# Patient Record
Sex: FEMALE | Race: WHITE | NOT HISPANIC OR LATINO | Employment: UNEMPLOYED | URBAN - METROPOLITAN AREA
[De-identification: names, ages, dates, MRNs, and addresses within clinical notes are randomized per-mention and may not be internally consistent; named-entity substitution may affect disease eponyms.]

---

## 2023-08-10 ENCOUNTER — HOSPITAL ENCOUNTER (OUTPATIENT)
Dept: DATA CONVERSION | Facility: HOSPITAL | Age: 23
Discharge: HOME | End: 2023-08-10

## 2023-08-10 DIAGNOSIS — M25.552 PAIN IN LEFT HIP: ICD-10-CM

## 2023-11-13 PROBLEM — K59.09 CHRONIC CONSTIPATION: Status: ACTIVE | Noted: 2023-11-13

## 2023-11-13 PROBLEM — K92.1 HEMATOCHEZIA: Status: ACTIVE | Noted: 2023-11-13

## 2023-11-13 PROBLEM — F41.9 ANXIETY: Status: ACTIVE | Noted: 2023-11-13

## 2023-11-13 PROBLEM — M25.852 FEMOROACETABULAR IMPINGEMENT OF LEFT HIP: Status: ACTIVE | Noted: 2023-11-13

## 2023-11-15 ENCOUNTER — OFFICE VISIT (OUTPATIENT)
Dept: PEDIATRICS | Facility: CLINIC | Age: 23
End: 2023-11-15
Payer: COMMERCIAL

## 2023-11-15 VITALS
BODY MASS INDEX: 22.81 KG/M2 | SYSTOLIC BLOOD PRESSURE: 104 MMHG | HEART RATE: 78 BPM | HEIGHT: 65 IN | WEIGHT: 136.9 LBS | DIASTOLIC BLOOD PRESSURE: 69 MMHG

## 2023-11-15 DIAGNOSIS — Z23 FLU VACCINE NEED: ICD-10-CM

## 2023-11-15 DIAGNOSIS — Z00.00 WELLNESS EXAMINATION: Primary | ICD-10-CM

## 2023-11-15 PROCEDURE — 90471 IMMUNIZATION ADMIN: CPT | Performed by: PEDIATRICS

## 2023-11-15 PROCEDURE — 99395 PREV VISIT EST AGE 18-39: CPT | Performed by: PEDIATRICS

## 2023-11-15 PROCEDURE — 90686 IIV4 VACC NO PRSV 0.5 ML IM: CPT | Performed by: PEDIATRICS

## 2023-11-15 PROCEDURE — 3008F BODY MASS INDEX DOCD: CPT | Performed by: PEDIATRICS

## 2023-11-15 PROCEDURE — 1036F TOBACCO NON-USER: CPT | Performed by: PEDIATRICS

## 2023-11-15 RX ORDER — POLYETHYLENE GLYCOL 3350 17 G/17G
1 POWDER, FOR SOLUTION ORAL DAILY
COMMUNITY
Start: 2022-09-06 | End: 2024-05-06 | Stop reason: WASHOUT

## 2023-11-15 RX ORDER — FLUOXETINE HYDROCHLORIDE 20 MG/1
40 CAPSULE ORAL DAILY
COMMUNITY
End: 2024-01-22

## 2023-11-15 RX ORDER — NORETHINDRONE ACETATE AND ETHINYL ESTRADIOL AND FERROUS FUMARATE 1.5-30(21)
KIT ORAL
COMMUNITY
End: 2023-12-16 | Stop reason: SDUPTHER

## 2023-11-15 RX ORDER — DICYCLOMINE HYDROCHLORIDE 10 MG/1
CAPSULE ORAL
COMMUNITY
Start: 2022-09-06 | End: 2023-12-28 | Stop reason: WASHOUT

## 2023-11-15 NOTE — PATIENT INSTRUCTIONS
The Flu shot was given today  Continue the prozac 40mg daily  We talked about seeing gyn to talk about the other options for period control- we talked about seeing Dr Nath or Isaura down the aguilar.  You can see anyone.  It was great to see you today

## 2023-11-15 NOTE — PROGRESS NOTES
"Subjective   Sara Benton is a 23 y.o. female who presents for Well Child (23 year [facility]/ Here with Dad).  HPI      Concerns:   Here with dad  On ocps- wants to switch to something she doesn't have to take oral    Had an episode of turning red after drinking a martini    Still working with sports medicine about the hip issue    Discussion about exam and chaperone options-  declined chaperone and parent left room for rest of visit  Sleep: well rested and waking up well in the morning   Diet: offering a variety of food groups  [place]:  soft and regular  Dental:  brushing twice a day and seeing dentist  School:   doing well at [employer]  Activities:  working out- also has two jobs and coaches basketball   Menstruation: on ocps  Drugs/Alcohol/Tobacco/Vaping: discussed and no concerns with use  Sexuality/Puberty: discussed  Safety: discussed   Depression doing well on prozac-       ROS: negative for general,  Eyes, ENT, cardiovascular, GI. , Ortho, Derm, Psych, Lymph unless noted above    Objective   /69   Pulse 78   Ht 1.638 m (5' 4.5\")   Wt 62.1 kg (136 lb 14.4 oz) Comment: 136.9lb  BMI 23.14 kg/m²   Percentiles: Facility age limit for growth %nixon is 20 years.  Facility age limit for growth %nixon is 20 years.        Physical Exam  General: Well-developed, well-nourished, alert and oriented, no acute distress  Eyes: Normal sclera, VIOLETA, EOMI. Red reflex intact, light reflex symmetric.   ENT: Moist mucous membranes, normal throat, no nasal discharge. TMs are normal.  Cardiac:  Normal S1/S2, regular rhythm. Capillary refill less than 2 seconds. No clinically significant murmurs.    Pulmonary: Clear to auscultation bilaterally, no work of breathing.  GI: Soft nontender nondistended abdomen, no HSM, no masses.    Skin: No specific or unusual rashes  Neuro: Symmetric face, no ataxia, grossly normal strength and normal reflexes.  Lymph and Neck: No lymphadenopathy, no visible thyroid " swelling.  Musculoskeletal:   Full  range of motion, normal strength and tone, no significant scoliosis,  no joint swelling or bone tenderness  Psych:  normal mood and affect  :  normal female  Sunday:         Assessment/Plan   Diagnoses and all orders for this visit:  Wellness examination  BMI 23.0-23.9, adult  Flu vaccine need  -     Flu vaccine (IIV4) age 6 months and greater, preservative free      Patient Instructions   The Flu shot was given today  Continue the prozac 40mg daily  We talked about seeing gyn to talk about the other options for period control- we talked about seeing Dr Nath or Isaura down the [place].  You can see anyone.  It was great to see you today      I saw and evaluated the patient.  I personally obtained the key and critical portions of the history and physical exam. I reviewed the student's documentation and discussed the patient with the student.  I made the medical decision making as documented in this note.         Xiomara Walsh MD

## 2023-12-14 DIAGNOSIS — Z00.00 WELLNESS EXAMINATION: Primary | ICD-10-CM

## 2023-12-16 RX ORDER — NORETHINDRONE ACETATE AND ETHINYL ESTRADIOL AND FERROUS FUMARATE 1.5-30(21)
1 KIT ORAL DAILY
Qty: 84 TABLET | Refills: 3 | Status: SHIPPED | OUTPATIENT
Start: 2023-12-16 | End: 2023-12-28 | Stop reason: SDUPTHER

## 2023-12-28 ENCOUNTER — OFFICE VISIT (OUTPATIENT)
Dept: OBSTETRICS AND GYNECOLOGY | Facility: CLINIC | Age: 23
End: 2023-12-28
Payer: COMMERCIAL

## 2023-12-28 VITALS
WEIGHT: 139 LBS | DIASTOLIC BLOOD PRESSURE: 74 MMHG | HEIGHT: 65 IN | BODY MASS INDEX: 23.16 KG/M2 | SYSTOLIC BLOOD PRESSURE: 116 MMHG

## 2023-12-28 DIAGNOSIS — Z30.41 ENCOUNTER FOR SURVEILLANCE OF CONTRACEPTIVE PILLS: ICD-10-CM

## 2023-12-28 DIAGNOSIS — Z00.00 WELLNESS EXAMINATION: ICD-10-CM

## 2023-12-28 DIAGNOSIS — Z01.419 WELL WOMAN EXAM WITH ROUTINE GYNECOLOGICAL EXAM: Primary | ICD-10-CM

## 2023-12-28 DIAGNOSIS — Z12.4 CERVICAL CANCER SCREENING: ICD-10-CM

## 2023-12-28 PROCEDURE — 87800 DETECT AGNT MULT DNA DIREC: CPT

## 2023-12-28 PROCEDURE — 1036F TOBACCO NON-USER: CPT

## 2023-12-28 PROCEDURE — 87661 TRICHOMONAS VAGINALIS AMPLIF: CPT

## 2023-12-28 PROCEDURE — 88175 CYTOPATH C/V AUTO FLUID REDO: CPT

## 2023-12-28 PROCEDURE — 88141 CYTOPATH C/V INTERPRET: CPT | Performed by: PATHOLOGY

## 2023-12-28 PROCEDURE — 3008F BODY MASS INDEX DOCD: CPT

## 2023-12-28 PROCEDURE — 99385 PREV VISIT NEW AGE 18-39: CPT

## 2023-12-28 RX ORDER — NORETHINDRONE ACETATE AND ETHINYL ESTRADIOL 1.5-30(21)
1 KIT ORAL DAILY
Qty: 84 TABLET | Refills: 3 | Status: SHIPPED | OUTPATIENT
Start: 2023-12-28

## 2023-12-28 NOTE — PROGRESS NOTES
"Assessment/Plan     23 y.o. presents for well woman exam.     Cervical Cancer Screening  - first PAP today  - Reviewed ASCCP guidelines with pt; next PAP in 3 yrs    STD Screening  - accepts GC/CT/Trich off of PAP    Breakthrough bleeding on OCP's  - discussed with patient that breakthrough bleeding is common when taking OCP's continuously, and if she were to resume taking placebo pills, this could promote more menstrual regularity    Contraceptive Plan  - Currently taking Tatiana Fe 1.5/30 OCP, pt overall happy with this method of contraception, but would like to discuss possible alternatives  - Pt counseled on available methods of contraception including OCP's, BC Ring, BC Patch, Depo, Nexplanon, and IUD. Risks and benefits discussed of each, all questions answered. Pt elects to continue OCP for now, but may schedule for IUD insertion in the future. Pt provided with informational pamphlets on Mirena, ParaGard, and Liletta.   - The risks of clotting with birth control containing estrogen was discussed with the patient. She denies a personal history of smoking, migraine with aura, blood clotting and hypertension. She denies having any relatives with a history of DVT or PE, and any relatives less than 50 years old with a history of MI or CVA.   - Patient aware of risks and consents to continuing this method. Refill Rx sent to pt preferred pharmacy.    Health Maintenance  - SBE reviewed and encouraged monthly  - diet and exercise reviewed; recommended 150min exercise per week or 30min/day x5 days  - Routine follow up with PCP for health maintenance examination encouraged    F/U in 1 year or as needed.    JONATHAN Yates-NICK     Subjective     Sara Benton \"Laure\" is a 23 y.o. female presenting for a well woman exam. She currently takes Tatiana Fe 1.5/30 OCP for birth control. She states every 2-3 months she will experience inter-menstrual spotting for a couple of days. She does take the active pills " "continuously, skipping placebo pills.    PMH, PSH, allergies, and current medications reviewed - see chart.  Family Hx reviewed. Pt reports no family hx of gynecologic or breast cancer in first degree family members.   OB Hx:      Social Hx:  - relationship: dating  - sexually active: yes  - employment: studying marketing, business at [employer]  - diet:  general, healthy  - exercise: yes  - denies tobacco or illicit drug use. reports occasional alcohol use.     Sexual Concerns: denies   PAP Hx: Last PAP N/A  STI Hx: remote history of CT  Contraception: OCP  Menstrual Periods: Patient's last menstrual period was 2023 (exact date). Periods are irregular, lasting  2-10  days.  HPV Vaccination Status: vaccinated    Objective     /74   Ht 1.638 m (5' 4.5\")   Wt 63 kg (139 lb)   LMP 2023 (Exact Date)   BMI 23.49 kg/m²     Physical Exam  Vitals reviewed.   Constitutional:       General: She is not in acute distress.     Appearance: Normal appearance.   HENT:      Head: Normocephalic and atraumatic.      Mouth/Throat:      Palate: No mass.   Neck:      Thyroid: No thyroid mass, thyromegaly or thyroid tenderness.   Cardiovascular:      Rate and Rhythm: Normal rate and regular rhythm.      Heart sounds: Normal heart sounds, S1 normal and S2 normal.   Pulmonary:      Effort: Pulmonary effort is normal.      Breath sounds: Normal breath sounds.   Chest:   Breasts:     Right: Normal. No mass, nipple discharge, skin change or tenderness.      Left: Normal. No mass, nipple discharge, skin change or tenderness.   Abdominal:      General: Abdomen is flat.      Palpations: Abdomen is soft.      Tenderness: There is no abdominal tenderness.   Genitourinary:     General: Normal vulva.      Exam position: Lithotomy position.      Pubic Area: No rash.       Labia:         Right: No rash or lesion.         Left: No rash or lesion.       Urethra: No prolapse, urethral swelling or urethral lesion.      Vagina: Normal. "      Cervix: No cervical motion tenderness, discharge or lesion.      Uterus: Not enlarged and not tender.       Adnexa:         Right: No mass or tenderness.          Left: No mass or tenderness.     Musculoskeletal:         General: Normal range of motion.      Cervical back: Normal range of motion and neck supple.   Skin:     General: Skin is warm and dry.   Neurological:      Mental Status: She is alert and oriented to person, place, and time.   Psychiatric:         Mood and Affect: Mood normal.         Behavior: Behavior normal.         Thought Content: Thought content normal.         Judgment: Judgment normal.

## 2023-12-30 LAB
C TRACH RRNA SPEC QL NAA+PROBE: NEGATIVE
N GONORRHOEA DNA SPEC QL PROBE+SIG AMP: NEGATIVE
T VAGINALIS RRNA SPEC QL NAA+PROBE: NEGATIVE

## 2024-01-16 LAB
CYTOLOGY CMNT CVX/VAG CYTO-IMP: NORMAL
LAB AP HPV GENOTYPE QUESTION: YES
LAB AP HPV HR: NORMAL
LAB AP PAP ADDITIONAL TESTS: NORMAL
LABORATORY COMMENT REPORT: NORMAL
LMP START DATE: NORMAL
PATH REPORT.TOTAL CANCER: NORMAL

## 2024-05-06 ENCOUNTER — PROCEDURE VISIT (OUTPATIENT)
Dept: OBSTETRICS AND GYNECOLOGY | Facility: CLINIC | Age: 24
End: 2024-05-06
Payer: COMMERCIAL

## 2024-05-06 VITALS
WEIGHT: 137 LBS | BODY MASS INDEX: 22.82 KG/M2 | HEIGHT: 65 IN | DIASTOLIC BLOOD PRESSURE: 82 MMHG | SYSTOLIC BLOOD PRESSURE: 112 MMHG

## 2024-05-06 DIAGNOSIS — Z30.430 ENCOUNTER FOR INSERTION OF PARAGARD IUD: ICD-10-CM

## 2024-05-06 LAB — PREGNANCY TEST URINE, POC: NEGATIVE

## 2024-05-06 PROCEDURE — 58300 INSERT INTRAUTERINE DEVICE: CPT | Performed by: OBSTETRICS & GYNECOLOGY

## 2024-05-06 PROCEDURE — 81025 URINE PREGNANCY TEST: CPT | Performed by: OBSTETRICS & GYNECOLOGY

## 2024-05-06 RX ORDER — ALBUTEROL SULFATE 90 UG/1
AEROSOL, METERED RESPIRATORY (INHALATION)
COMMUNITY
Start: 2023-11-24

## 2024-05-06 NOTE — PROGRESS NOTES
"Patient presents for a Paragard Insertion    Patient ID: Sara Benton \"Krista" is a 23 y.o. female.    IUD Insertion    Date/Time: 5/6/2024 1:56 PM    Performed by: Amanda Dotson MD  Authorized by: Amanda Dotson MD    Consent:     Consent obtained:  Written    Procedure risks and benefits discussed: yes      Patient questions answered: yes      Patient agrees, verbalizes understanding, and wants to proceed: yes    Procedure:     Pelvic exam performed: yes      Negative urine pregnancy test: yes      Cervix cleaned and prepped: yes      Speculum placed in vagina: yes      Tenaculum applied to cervix: yes      Uterus sounded: yes      Uterus sound depth (cm):  7    IUD inserted with no complications: yes      IUD type:  ParaGard    Strings trimmed: yes    Post-procedure:     Patient tolerated procedure well: yes      Patient will follow up after next period: yes    Comments:      Uncomplicated paragard insertion after discussion of risks including bleeding, infection, uterine perforation requiring laparoscopic removal.  Will return in 4-6 wks for string check.     Amanda Dotson MD      "

## 2024-06-03 ENCOUNTER — APPOINTMENT (OUTPATIENT)
Dept: OBSTETRICS AND GYNECOLOGY | Facility: CLINIC | Age: 24
End: 2024-06-03
Payer: COMMERCIAL

## 2024-06-04 ENCOUNTER — OFFICE VISIT (OUTPATIENT)
Dept: ORTHOPEDIC SURGERY | Facility: CLINIC | Age: 24
End: 2024-06-04
Payer: COMMERCIAL

## 2024-06-04 VITALS — HEIGHT: 65 IN | BODY MASS INDEX: 22.66 KG/M2 | WEIGHT: 136 LBS

## 2024-06-04 DIAGNOSIS — M25.852 FEMOROACETABULAR IMPINGEMENT OF LEFT HIP: Primary | ICD-10-CM

## 2024-06-04 DIAGNOSIS — M54.32 SCIATICA, LEFT SIDE: ICD-10-CM

## 2024-06-04 PROCEDURE — 3008F BODY MASS INDEX DOCD: CPT | Performed by: FAMILY MEDICINE

## 2024-06-04 PROCEDURE — 99214 OFFICE O/P EST MOD 30 MIN: CPT | Performed by: FAMILY MEDICINE

## 2024-06-04 PROCEDURE — 1036F TOBACCO NON-USER: CPT | Performed by: FAMILY MEDICINE

## 2024-06-04 NOTE — PROGRESS NOTES
History of Present Illness   Chief Complaint   Patient presents with    Left Hip - Pain, Follow-up       The patient is 24 y.o. female  here for follow-up of left hip pain as well as some left-sided low back pain with sciatic symptoms of the left lower extremity.  Patient was last seen by me in August 2023, initial visit with me in April 2023.  See previous notes for full details.  In brief patient initially presented with some acute on chronic left-sided low back pain with radicular symptoms into the left lower extremity, she had no red flag signs or symptoms, x-rays of lumbar spine were unremarkable, these were obtained on 5/16/2023.  Was referred to physical therapy initially for her back, at her 1 month follow-up visit she was having some new pain in the anterior aspect of her left hip and groin with some popping, I recommended that she begin some physical therapy for her hip as well, she did do physical therapy for several months through [facility] including some dry needling, hip, core strengthening and stability, she did see some improvement in symptoms with his physical therapy but still had some lingering discomfort, at the time more prominent in the hip, there was some concern for potential labral tear, did place an order for an MRI arthrogram.  There was some issues with her insurance as they wanted her to have MRI done outside of  facility, ultimately followed up with [facility] radiology however they did a nonarthrogram MRI of her hip which was inconclusive for labral tear, we did follow-up with the radiology department as they said that they changed the order without contacting me, they did offer, attempt to assist in rescheduling MRI arthrogram without additional cost the patient but patient says that there is difficulty getting this scheduled and ultimately did not pursue this.  I did do a left hip joint injection last summer, intra-articular injection, she feels like this provided some mild  improvement but says she would not want to pursue another injection at this time.  She says that over the fall she felt like symptoms were getting better, mild intermittent discomfort in her hip, low back pain seem to improve.  She says that in March and April of this year she had several flights to [place] with some friends for various vacations and feels like she aggravated both her hip and her back pain with this.  Hip pain remains over the anterior aspect of her hip and into the groin, low back pain in the left low back with radiation of pain down the back of her leg to the level of her foot, sciatic symptoms are more intermittent in nature.  She has started to do her home exercises again with minimal change in symptoms.  Prior to seeing patient last year she was quite active in the gym doing lifting several days a week, she says that she has modified her routine and is more walking for exercise and doing no weight training.      Past Medical History:   Diagnosis Date    Other abnormalities of gait and mobility 10/21/2015    Poor balance    Pain in right wrist 10/18/2019    Right wrist pain    Pain in unspecified ankle and joints of unspecified foot 09/29/2015    Ankle pain    Peroneal tendinitis, left leg 11/18/2015    Peroneal tendinitis of left lower extremity       Medication Documentation Review Audit       Reviewed by Silke Gaffney MA (Medical Assistant) on 05/06/24 at 1318      Medication Order Taking? Sig Documenting Provider Last Dose Status   albuterol 90 mcg/actuation inhaler [id number] Yes  Historical Provider, MD  Active   FLUoxetine (PROzac) 20 mg capsule [id number]  TAKE 1 CAPSULE BY MOUTH EVERY DAY Xiomara Walsh MD  Active   norethindrone-e.estradioL-iron (Tatiana Therese 1.5/30, 28,) 1.5 mg-30 mcg (21)/75 mg (7) tablet [id number]  Take 1 tablet by mouth once daily. COTY Yates  Active     Discontinued 05/06/24 1318                    Allergies   Allergen Reactions    Pollen Extracts  Runny nose       Social History     Socioeconomic History    Marital status: Single     Spouse name: Not on file    Number of children: Not on file    Years of education: Not on file    Highest education level: Not on file   Occupational History    Not on file   Tobacco Use    Smoking status: Never    Smokeless tobacco: Never   Vaping Use    Vaping status: Never Used   Substance and Sexual Activity    Alcohol use: Yes     Alcohol/week: 4.0 standard drinks of alcohol     Types: 4 Standard drinks or equivalent per week    Drug use: Never    Sexual activity: Yes     Partners: Male     Birth control/protection: OCP   Other Topics Concern    Not on file   Social History Narrative    Not on file     Social Determinants of Health     Financial Resource Strain: Not on file   Food Insecurity: Not on file   Transportation Needs: Not on file   Physical Activity: Not on file   Stress: Not on file   Social Connections: Not on file   Intimate Partner Violence: Not on file   Housing Stability: Not on file       No past surgical history on file.       Review of Systems   GENERAL: Negative  GI: Negative  MUSCULOSKELETAL: See HPI  SKIN: Negative  NEURO:  Negative     Physical Exam:    General/Constitutional: well appearing, no distress, appears stated age  HEENT: sclera clear  Respiratory: non labored breathing  Vascular: No edema, swelling or tenderness, except as noted in detailed exam.  Integumentary: No impressive skin lesions present, except as noted in detailed exam.  Neurological:  Alert and oriented   Psychological:  Normal mood and affect.  Musculoskeletal: Normal, except as noted in detailed exam and in HPI      Lumbar spine: Normal appearance. No midline tenderness.  Mild tenderness at the left lower lumbar spine paraspinal muscles and left SI joint. She has good range of motion with forward flexion, extension, bilateral twisting and side bending today without any significant exacerbation of pain. Negative straight leg  raise test and slump test. No pathologic lower extremity reflexes are present. Negative clonus. Sensation intact to light touch throughout bilateral lower extremity. No lower extremity motor deficits.      Left hip: Normal appearance, no rotational deformity or leg length discrepancy. She has some tenderness palpation over the anterior hip near the hip flexor region as well as some tenderness laterally at the greater trochanter. Very minimal tenderness palpation at the posterior hip at the SI joint. She has preserved range of motion with flexion, internal and external rotation. No motor deficits are present at the hip, pain with resisted hip flexion. Positive FADIR maneuver, positive Stinchfield test      Imaging: No new imaging today, previous x-rays of lumbar spine and MRI done at our [place] office were unremarkable, nonarthrogram hip MRI was unremarkable      Assessment   1. Femoroacetabular impingement of left hip        2. Sciatica, left side  MR lumbar spine wo IV contrast            Plan:    With regards to her left hip she is having recurrent left hip pain thought to be coming from hip joint, has done extensive physical therapy for several months last year, has resumed home exercises without improvement.  Prior hip injection did give some relief.  Has had left hip MRI which was supposed to be with arthrogram but this was done incorrectly and had IV contrast instead, nondiagnostic for hip labral tear, still some concern for possible hip labral component to her symptoms.  I am recommending that she follow-up with one of my surgical colleagues close to home, Dr. Jennings to review her MRI and determine if he thinks a new MRI arthrogram is warranted for further treatment of her hip pain    2.  With regards to her low back pain with sciatic symptoms, similar ongoing symptoms despite conservative management including physical therapy, prior x-ray has been unremarkable.  I would like to obtain an MRI of her  lumbar spine for evaluation of possible disc herniation that may benefit from more aggressive intervention including possible epidural injection through medical spine.  Order for MRI was placed today.  Further treatment pending MRI results, she can continue with her home exercise rehabilitation.

## 2024-06-25 ENCOUNTER — HOSPITAL ENCOUNTER (OUTPATIENT)
Dept: RADIOLOGY | Facility: HOSPITAL | Age: 24
Discharge: HOME | End: 2024-06-25
Payer: COMMERCIAL

## 2024-06-25 ENCOUNTER — APPOINTMENT (OUTPATIENT)
Dept: ORTHOPEDIC SURGERY | Facility: CLINIC | Age: 24
End: 2024-06-25
Payer: COMMERCIAL

## 2024-06-25 ENCOUNTER — HOSPITAL ENCOUNTER (OUTPATIENT)
Dept: RADIOLOGY | Facility: CLINIC | Age: 24
Discharge: HOME | End: 2024-06-25
Payer: COMMERCIAL

## 2024-06-25 DIAGNOSIS — M25.852 FEMOROACETABULAR IMPINGEMENT OF LEFT HIP: ICD-10-CM

## 2024-06-25 DIAGNOSIS — M54.32 SCIATICA, LEFT SIDE: ICD-10-CM

## 2024-06-25 PROCEDURE — 73502 X-RAY EXAM HIP UNI 2-3 VIEWS: CPT | Mod: LT

## 2024-06-25 PROCEDURE — 99204 OFFICE O/P NEW MOD 45 MIN: CPT | Performed by: STUDENT IN AN ORGANIZED HEALTH CARE EDUCATION/TRAINING PROGRAM

## 2024-06-25 PROCEDURE — 3008F BODY MASS INDEX DOCD: CPT | Performed by: STUDENT IN AN ORGANIZED HEALTH CARE EDUCATION/TRAINING PROGRAM

## 2024-06-25 PROCEDURE — 72148 MRI LUMBAR SPINE W/O DYE: CPT | Performed by: RADIOLOGY

## 2024-06-25 PROCEDURE — 73502 X-RAY EXAM HIP UNI 2-3 VIEWS: CPT | Mod: LEFT SIDE | Performed by: RADIOLOGY

## 2024-06-25 PROCEDURE — 72148 MRI LUMBAR SPINE W/O DYE: CPT

## 2024-06-25 ASSESSMENT — PAIN - FUNCTIONAL ASSESSMENT: PAIN_FUNCTIONAL_ASSESSMENT: 0-10

## 2024-06-25 ASSESSMENT — PAIN DESCRIPTION - DESCRIPTORS: DESCRIPTORS: ACHING;THROBBING

## 2024-06-25 ASSESSMENT — PAIN SCALES - GENERAL: PAINLEVEL_OUTOF10: 4

## 2024-06-25 NOTE — LETTER
June 26, 2024     Jean Claude Hardy MD  [address] [id number]    Patient: Laure Benton   YOB: 2000   Date of Visit: 6/25/2024       Dear Dr. Jean Claude Hardy MD:    Thank you for referring Laure Benton to me for evaluation. Below are my notes for this consultation.  If you have questions, please do not hesitate to call me. I look forward to following your patient along with you.       Sincerely,     Kobi Jennings MD      CC: No Recipients  ______________________________________________________________________________________    Sara Benton  is a 24 y.o. year-old  female. she is a new patient to our office and presents with a chief complaint of Left  hip pain; referred by [Dr Hardy].  The pain is been insidious in onset. The pain's location is anterior and deep in the groin and is a sharp/pinchy and achy at rest type pain. It is worse with stairs and activities especially getting in and out of a car. The symptoms have been treated with rest and activity modification including avoidance of hip stretching activities and deep flexion activities. NSAIDS including have also been used with minimal improvement. They [have] attempted physical therapy. They [have] had an intra-articular corticosteroid injection.  Reports she had excellent relief with the intra-articular corticosteroid injection maybe 4 to 5 months of really no pain at all.  They [have not] had a previous hip surgery.  She has had a recent MRI however not an arthrogram which was inconclusive for labral tear pathology.     Past Medical, Family, and Social History reviewed and inlcude:[none] all other history pertinent to the presenting problem  Review of Systems  A complete review of systems was conducted, pertinent only to the HPI noted above.  Constitutional: None  Eyes: No additions to above history  Ears, Nose, Throat: No additions to above history  Cardiovascular: No additions to above history  Respiratory: No  additions to above history  GI: No additions to above history  : No additions to above history  Skin/Neuro: No additions to above history  Endocrine/Heme/Lymph: No additions to above history  Immunologic: No additions to above history  Psychiatric: No additions to above history  Musculoskeletal: see above  Eyes: sclera anicteric  ENT: hearing appropriate for normal conversation, neck appears symmetric with no gross thyromegaly  Pulm: No labored breathing, no wheezing  CVS: Regular rate and rhythm  Abd: Non-distended  Skin: No rashes, erythema, or induration around hip    MUSCULOSKELETAL EXAM: HIP      Left  HIP:   IR@90: [40] degrees   ER@90: [70] degrees   Pain in groin with FADIR this reproduces her pain, + stinchfield, + subspine, no TTP over GT      Neurovascularly Intact to Femroal/obturator/LFCN/S/S/SPN/DPN/T nerves on bilateral extremities    Xrays and MRI independently reviewed and interpreted: My interpretation she has no acetabular dysplasia there is a pincer deformity and a small cam with an alpha angle on my measurement of roughly 55 degrees no obvious labrum pathology or degenerative changes on her MRI there is her hip.    The patient history, physical examination and imaging studies are consistent with the diagnosis of femoroacetabular impingement (ABIEL).    Options were discussed with the patient today, including continued conservative management vs. potential surgical intervention.     I do believe clinically she has a diagnosis of femoral acetabular impingement however there was no labrum tear seen on the MRI.  Given her ongoing symptoms and the potential for future surgical management I have recommended an MRI arthrogram to confirm labral tear pathology.  This has been ordered she will return to review.  She also has an upcoming MRI of the lumbar spine-she appears also to have some radicular symptoms and I think it would be good to rule that out as a source of pain as well.  She will return to  review her MRI.  All questions were answered she is at this point.    We will see her back in [6] [weeks] for further follow up.

## 2024-06-26 NOTE — PROGRESS NOTES
Sara Benton  is a 24 y.o. year-old  female. she is a new patient to our office and presents with a chief complaint of Left  hip pain; referred by [Dr Hardy].  The pain is been insidious in onset. The pain's location is anterior and deep in the groin and is a sharp/pinchy and achy at rest type pain. It is worse with stairs and activities especially getting in and out of a car. The symptoms have been treated with rest and activity modification including avoidance of hip stretching activities and deep flexion activities. NSAIDS including have also been used with minimal improvement. They [have] attempted physical therapy. They [have] had an intra-articular corticosteroid injection.  Reports she had excellent relief with the intra-articular corticosteroid injection maybe 4 to 5 months of really no pain at all.  They [have not] had a previous hip surgery.  She has had a recent MRI however not an arthrogram which was inconclusive for labral tear pathology.     Past Medical, Family, and Social History reviewed and inlcude:[none] all other history pertinent to the presenting problem  Review of Systems  A complete review of systems was conducted, pertinent only to the HPI noted above.  Constitutional: None  Eyes: No additions to above history  Ears, Nose, Throat: No additions to above history  Cardiovascular: No additions to above history  Respiratory: No additions to above history  GI: No additions to above history  : No additions to above history  Skin/Neuro: No additions to above history  Endocrine/Heme/Lymph: No additions to above history  Immunologic: No additions to above history  Psychiatric: No additions to above history  Musculoskeletal: see above  Eyes: sclera anicteric  ENT: hearing appropriate for normal conversation, neck appears symmetric with no gross thyromegaly  Pulm: No labored breathing, no wheezing  CVS: Regular rate and rhythm  Abd: Non-distended  Skin: No rashes, erythema, or induration around  hip    MUSCULOSKELETAL EXAM: HIP      Left  HIP:   IR@90: [40] degrees   ER@90: [70] degrees   Pain in groin with FADIR this reproduces her pain, + stinchfield, + subspine, no TTP over GT      Neurovascularly Intact to Femroal/obturator/LFCN/S/S/SPN/DPN/T nerves on bilateral extremities    Xrays and MRI independently reviewed and interpreted: My interpretation she has no acetabular dysplasia there is a pincer deformity and a small cam with an alpha angle on my measurement of roughly 55 degrees no obvious labrum pathology or degenerative changes on her MRI there is her hip.    The patient history, physical examination and imaging studies are consistent with the diagnosis of femoroacetabular impingement (ABIEL).    Options were discussed with the patient today, including continued conservative management vs. potential surgical intervention.     I do believe clinically she has a diagnosis of femoral acetabular impingement however there was no labrum tear seen on the MRI.  Given her ongoing symptoms and the potential for future surgical management I have recommended an MRI arthrogram to confirm labral tear pathology.  This has been ordered she will return to review.  She also has an upcoming MRI of the lumbar spine-she appears also to have some radicular symptoms and I think it would be good to rule that out as a source of pain as well.  She will return to review her MRI.  All questions were answered she is at this point.    We will see her back in [6] [weeks] for further follow up.

## 2024-07-01 DIAGNOSIS — M25.859 FEMOROACETABULAR IMPINGEMENT: Primary | ICD-10-CM

## 2024-07-01 DIAGNOSIS — M54.10 RADICULOPATHY, UNSPECIFIED SPINAL REGION: Primary | ICD-10-CM

## 2024-07-02 DIAGNOSIS — M25.852 FEMOROACETABULAR IMPINGEMENT OF LEFT HIP: ICD-10-CM

## 2024-07-15 ENCOUNTER — APPOINTMENT (OUTPATIENT)
Dept: ORTHOPEDIC SURGERY | Facility: CLINIC | Age: 24
End: 2024-07-15
Payer: COMMERCIAL

## 2024-07-15 DIAGNOSIS — M51.36 LUMBAR DEGENERATIVE DISC DISEASE: ICD-10-CM

## 2024-07-15 DIAGNOSIS — M54.16 LUMBAR RADICULITIS: Primary | ICD-10-CM

## 2024-07-15 DIAGNOSIS — M79.602 LEFT ARM PAIN: ICD-10-CM

## 2024-07-15 PROCEDURE — 3008F BODY MASS INDEX DOCD: CPT | Performed by: PHYSICAL MEDICINE & REHABILITATION

## 2024-07-15 PROCEDURE — 99204 OFFICE O/P NEW MOD 45 MIN: CPT | Performed by: PHYSICAL MEDICINE & REHABILITATION

## 2024-07-15 PROCEDURE — 1036F TOBACCO NON-USER: CPT | Performed by: PHYSICAL MEDICINE & REHABILITATION

## 2024-07-15 RX ORDER — GABAPENTIN 300 MG/1
300 CAPSULE ORAL 3 TIMES DAILY
Qty: 90 CAPSULE | Refills: 1 | Status: SHIPPED | OUTPATIENT
Start: 2024-07-15 | End: 2024-08-14

## 2024-07-15 SDOH — SOCIAL STABILITY: SOCIAL NETWORK: SOCIAL ACTIVITY:: 5

## 2024-07-15 NOTE — PROGRESS NOTES
New Consult/New Patient Note    7/15/2024   No ref. provider found    Assessment: Sara Benton is a 24 y.o. female who presents for initial evaluation of low back pain with left radicular pain. Physical exam is reassuring for lack of neurologic compromise. Pain is intermittently severe and affecting activities of daily living. Lumbar imaging reviewed, showing mild disc bulge at L4-5 and L5-S1 without significant neuroforaminal narrowing appreciated. Pain is likely multifactorial with component of lumbar radiculitis and left hip femoral acetabular impingement.     -August 21, 2023: Left intra-articular hip injection with Dr. Hardy-approximately 80% relief for nearly 3 months.    PLAN:  1)  Imaging/Diagnostic Studies: Lumbar MRI reviewed with findings as above. Left hip intra-articular injection with ultrasound imaging reviewed as well   2)  Therapy/Rehabilitation: Referral for PT with focus on core and lower back strengthening-recommend Be  3)  Pharmacological Management: Prescription for Gabapentin sent to patient's preferred pharmacy. Gabapentin titration discussed with patient who states understanding.  OARRS checked with no suspicious activity and pain agreement was signed  4)  Spine/Surgical Interventions: none at this time  5)  Alternative Treatments: May consider alternative treatment options in the future including manipulation (chiropractor versus osteopathic) and/or acupuncture if patient does not obtain optimal relief with initial treatment plan.  6)  Consultations: Physical therapy  7)  Follow -up: 4-6 weeks or PRN if symptoms worsen/do not improve.   8)  Future treatment considerations: EMG to evaluate for lumbar radiculitis in setting of inconclusive MRI findings, consider epidural steroid injection in future.  Further titration of gabapentin    Patient advised of the difference between hurt and harm and advised to continue with all normal activities and exercises. Patient verbalized  "understanding of the above plan and was happy with the care provided.      The above clinical summary has been dictated with voice recognition software. It has not been proofread for grammatical errors, typographical mistakes, or other semantic inconsistencies.    Thank you for visiting our office today. It was our pleasure to take part in your healthcare.     Do not hesitate to call with any questions regarding your plan of care after leaving at [phone]    To clinicians, thank you very much for this kind referral. It is a privilege to partner with you in the care of your patients. My office would be delighted to assist you with any further consultations or with questions regarding the plan of care outlined. Do not hesitate to call the office or contact me directly.     Sincerely,    Regina Quinones,    PM&R PGY4    Seen with resident Dr. Quinones, note above and below is a joint effort in all areas.    I saw and evaluated the patient. I personally obtained the key and critical portions of the history and physical exam. I reviewed the resident's documentation and discussed the patient with the resident. I agree with the resident's medical decision making as documented in the resident's note, with my additions.      MYRANDA Blunt MD  , Physical Medicine and Rehabilitation, Orthopedic Spine  [facility] [employer] [facility]  [facility]         Sara Benton \"Laure\" is a 24 y.o. female who presents with lower back and left hip pain.  Has seen Dr. Jennings and Dr. Hardy. Pain worse since about 18 months ago - very active runner and with weigh training.     Had a intra-articular hip injection, had approximately 75-80% relief for approximately two months of her hip pain. However, reports that her left leg radicular symptoms continued and the pain is separate from her left groin/hip pain.     Location: left low back  Radiation:  Left leg to fifth " toe, numbness and tingling.    Quality:  current 6/10,  at its worst  /10  Exacerbated by carrying, lifting heavy weight  Relieved by rest  Onset, traumatic event: denies  Has tried:  Steroid injection with Dr. Hardy, lenora needling, PT, [place]    Valsalva sign is negative   Grocery cart sign is negative   Smoker:  denies   Does not wake them at night  Litigation: denies     Patient denies bowel/bladder incontinence, denies fever, denies unintentional weight loss, denies clumsiness of hands, feet, or dropping things.  Denies any constitutional or myelopathic symptomatology.      PREVIOUS TREATMENTS  IN THE LAST SIX MONTHS     Active conservative therapy  in the last six months (see below)              1. Physical therapy: Yes, most recently August 2023                                                                                     2. Home exercise program after PT: Completing stretches                                                     3. A physician supervised home exercise program (HEP):                 4. Chiropractic Care:                                                                      Passive conservative therapy  in the last six months (see below)              1. NSAIDS:                                                                                                           2. Prescription pain medication:                                                              3. Acupuncture:                                                                                             4. Tens unit:      Assistive Devices: denies    Work status: Working full time in food services       ROS: Other than listed in HPI, PMHX below, and intake paperwork including a 30 point patient-recorded review of symptoms which was personally reviewed and inclusive of no history of unintentional weight loss, change in appetite, significant malaise, fevers, chills, or change in bowel/bladder, shortness of breath, or chest pain.    I  have confirmed and edited as necessary Past Medical, Past Surgical, Family, Social History and ROS as obtained by others. These were also obtained on new patient forms.      PHYSICAL EXAM:   GENERAL APPEARANCE:  Well nourished, well developed, and no apparent distress.  NEURO PSYCH: Patient oriented to person, place, Mood pleasant. Benign affect.  MUSCULOSKELETAL and NEUROLOGICAL       VISUAL INSPECTION          CERVICAL: WNL          THORACIC: WNL           LUMBAR: WNL  SPINE ROM:   LUMBAR ROM: within normal limits       PALPATION:           SPINOUS PROCESS: non-tender            PARASPINALS: non-tender   FACET LOADING: negative   MUSCLE BULK: Normal and symmetrical in the upper & lower extremities.  MUSCLE TONE: Normal  MOTOR: 5/5 in all muscle groups tested in bilateral upper and lower extremities   SENSORY: Diminished light touch sensation to the medial malleolus, dorsum of foot, and lateral malleolus   GAIT: Normal.  Able to go up and heels and toes with no sig. weakness.  No sig. balance deficit appreciated  REFLEXES: +2 to bilateral lower extremities  LONG TRACT SIGNS: No clonus, Neg Hoffmans.  STRAIGHT LEG TEST: Positive on right with reproduction of radicular symptoms down the left lateral thigh, lateral calf, and into the foot   PERIPHERAL JOINT ROM:   HIP ROM: Full bilaterally  HARVINDER/Thigh Thrust/Compression/Justin Finger:  Negative bilaterally   Hip Exam including thigh thrust and LOG ROLL: positive on left with reproduction of pain in the groin    DATA REVIEW:   The below imaging studies were personally reviewed and discussed with the patient.    Lumbar MRI reviewed, findings as above.     Medical Decision Making:  The above note constitutes a Moderate to High level of medical decision making based on past data and imaging review, new and chronic symptoms with exacerbation, change in weakness or sensation, new imaging and diagnostic studies ordered, discussion of potential interventional or surgical  treatment options, acute or chronic pain that may pose a threat to bodily function.    Past Medical History:   Diagnosis Date    Other abnormalities of gait and mobility 10/21/2015    Poor balance    Pain in right wrist 10/18/2019    Right wrist pain    Pain in unspecified ankle and joints of unspecified foot 09/29/2015    Ankle pain    Peroneal tendinitis, left leg 11/18/2015    Peroneal tendinitis of left lower extremity       Medication Documentation Review Audit       Reviewed by Anita Kaur MA (Medical Assistant) on 07/15/24 at 0850      Medication Order Taking? Sig Documenting Provider Last Dose Status   albuterol 90 mcg/actuation inhaler [id number]   Historical Provider, MD  Active   FLUoxetine (PROzac) 20 mg capsule [id number] No TAKE 1 CAPSULE BY MOUTH EVERY DAY Xiomara Walsh MD Taking Active   norethindrone-e.estradioL-iron (Tatiana Fe 1.5/30, 28,) 1.5 mg-30 mcg (21)/75 mg (7) tablet [id number]  Take 1 tablet by mouth once daily. COTY Yates  Active                    Allergies   Allergen Reactions    Pollen Extracts Runny nose       Social History     Socioeconomic History    Marital status: Single     Spouse name: Not on file    Number of children: Not on file    Years of education: Not on file    Highest education level: Not on file   Occupational History    Not on file   Tobacco Use    Smoking status: Never    Smokeless tobacco: Never   Vaping Use    Vaping status: Never Used   Substance and Sexual Activity    Alcohol use: Yes     Alcohol/week: 4.0 standard drinks of alcohol     Types: 4 Standard drinks or equivalent per week    Drug use: Never    Sexual activity: Yes     Partners: Male     Birth control/protection: OCP   Other Topics Concern    Not on file   Social History Narrative    Not on file     Social Determinants of Health     Financial Resource Strain: Not on file   Food Insecurity: Not on file   Transportation Needs: Not on file   Physical Activity: Not on file    Stress: Not on file   Social Connections: Not on file   Intimate Partner Violence: Not on file   Housing Stability: Not on file       No past surgical history on file.

## 2024-07-24 ENCOUNTER — HOSPITAL ENCOUNTER (OUTPATIENT)
Dept: RADIOLOGY | Facility: HOSPITAL | Age: 24
Discharge: HOME | End: 2024-07-24
Payer: COMMERCIAL

## 2024-07-24 DIAGNOSIS — M25.852 FEMOROACETABULAR IMPINGEMENT OF LEFT HIP: ICD-10-CM

## 2024-07-24 PROCEDURE — 73525 CONTRAST X-RAY OF HIP: CPT | Mod: LT

## 2024-07-24 PROCEDURE — 2500000005 HC RX 250 GENERAL PHARMACY W/O HCPCS

## 2024-07-24 PROCEDURE — 27093 INJECTION FOR HIP X-RAY: CPT | Mod: LEFT SIDE | Performed by: RADIOLOGY

## 2024-07-24 PROCEDURE — A9575 INJ GADOTERATE MEGLUMI 0.1ML: HCPCS | Performed by: STUDENT IN AN ORGANIZED HEALTH CARE EDUCATION/TRAINING PROGRAM

## 2024-07-24 PROCEDURE — 27093 INJECTION FOR HIP X-RAY: CPT | Mod: LT

## 2024-07-24 PROCEDURE — 77002 NEEDLE LOCALIZATION BY XRAY: CPT | Mod: LEFT SIDE | Performed by: RADIOLOGY

## 2024-07-24 PROCEDURE — 2550000001 HC RX 255 CONTRASTS: Performed by: STUDENT IN AN ORGANIZED HEALTH CARE EDUCATION/TRAINING PROGRAM

## 2024-07-24 PROCEDURE — 73722 MRI JOINT OF LWR EXTR W/DYE: CPT | Mod: LEFT SIDE | Performed by: STUDENT IN AN ORGANIZED HEALTH CARE EDUCATION/TRAINING PROGRAM

## 2024-07-24 RX ORDER — LIDOCAINE HYDROCHLORIDE 10 MG/ML
INJECTION INFILTRATION; PERINEURAL
Status: COMPLETED
Start: 2024-07-24 | End: 2024-07-24

## 2024-07-24 RX ORDER — GADOTERATE MEGLUMINE 376.9 MG/ML
5 INJECTION INTRAVENOUS
Status: COMPLETED | OUTPATIENT
Start: 2024-07-24 | End: 2024-07-24

## 2024-07-24 RX ORDER — LIDOCAINE HYDROCHLORIDE 10 MG/ML
10 INJECTION, SOLUTION EPIDURAL; INFILTRATION; INTRACAUDAL; PERINEURAL ONCE
Status: CANCELLED | OUTPATIENT
Start: 2024-07-24 | End: 2024-07-24

## 2024-07-24 NOTE — POST-PROCEDURE NOTE
Interventional Radiology Brief Postprocedure Note    Attending: Kiki reeves    Assistant: N/A    Diagnosis: pain    Description of procedure: The risks, benefits and alternatives of the procedure were discussed with the patient. The patient was given the chance to ask questions, and all were answered prior to proceeding. At this time, written informed consent was obtained.      The patient was placed in the supine position on the fluoroscopic table and was prepped and draped in the usual sterile fashion. The left hip joint was localized under fluoroscopy. Lidocaine was used for local anesthesia. Under fluoroscopic guidance a 20 gauge needle was inserted into the left hip joint. Approximately 10 ML of a solution containing lidocaine, Omnipaque 240 iodinated contrast and Multihance gadolinium contrast was injected into the left hip joint.      The patient tolerated the procedure well and no immediate complication was noted.      Anesthesia:  Local    Complications: None    Estimated Blood Loss: none    Medications (Filter: Administrations occurring from 1656 to 1656 on 07/24/24) As of 07/24/24 1656      None          No specimens collected      See detailed result report with images in PACS.    The patient tolerated the procedure well without incident or complication and is in stable condition.

## 2024-08-09 ENCOUNTER — APPOINTMENT (OUTPATIENT)
Dept: ORTHOPEDIC SURGERY | Facility: CLINIC | Age: 24
End: 2024-08-09
Payer: COMMERCIAL

## 2024-08-09 DIAGNOSIS — S73.192D TEAR OF LEFT ACETABULAR LABRUM, SUBSEQUENT ENCOUNTER: Primary | ICD-10-CM

## 2024-08-09 DIAGNOSIS — M25.852 FEMOROACETABULAR IMPINGEMENT OF LEFT HIP: ICD-10-CM

## 2024-08-09 PROCEDURE — 1036F TOBACCO NON-USER: CPT | Performed by: STUDENT IN AN ORGANIZED HEALTH CARE EDUCATION/TRAINING PROGRAM

## 2024-08-09 PROCEDURE — 99214 OFFICE O/P EST MOD 30 MIN: CPT | Performed by: STUDENT IN AN ORGANIZED HEALTH CARE EDUCATION/TRAINING PROGRAM

## 2024-08-09 NOTE — PROGRESS NOTES
Sara Benton  is a 24 y.o. year-old  female. she returns regarding the arthrogram of her left hip.  Again she did have an injection that did provide considerable relief for some time.  She is also working on some lower back pain and possibly some radicular symptoms.  She is scheduled to begin with therapy for that.  She does endorse a lot of anterior groin pain.     Past Medical, Family, and Social History reviewed and inlcude:[none] all other history pertinent to the presenting problem  Review of Systems  A complete review of systems was conducted, pertinent only to the HPI noted above.  Constitutional: None  Eyes: No additions to above history  Ears, Nose, Throat: No additions to above history  Cardiovascular: No additions to above history  Respiratory: No additions to above history  GI: No additions to above history  : No additions to above history  Skin/Neuro: No additions to above history  Endocrine/Heme/Lymph: No additions to above history  Immunologic: No additions to above history  Psychiatric: No additions to above history  Musculoskeletal: see above  Eyes: sclera anicteric  ENT: hearing appropriate for normal conversation, neck appears symmetric with no gross thyromegaly  Pulm: No labored breathing, no wheezing  CVS: Regular rate and rhythm  Abd: Non-distended  Skin: No rashes, erythema, or induration around hip    MUSCULOSKELETAL EXAM: HIP      Left  HIP:   IR@90: [40] degrees   ER@90: [70] degrees   Pain in groin with FADIR this reproduces her pain, + stinchfield, + subspine, no TTP over GT      Neurovascularly Intact to Femroal/obturator/LFCN/S/S/SPN/DPN/T nerves on bilateral extremities    Xrays and MRI independently reviewed and interpreted: My interpretation she has no acetabular dysplasia there is a pincer deformity and a small cam with an alpha angle on my measurement of roughly 55 degrees no obvious labrum pathology or degenerative changes on her MRI there is her hip.  MRI arthrogram  independently reviewed and interpreted: Suggestion of an anterior superior labrum tear although small no chondral injury    The patient history, physical examination and imaging studies are consistent with the diagnosis of femoroacetabular impingement (ABIEL).    Options were discussed with the patient today, including continued conservative management vs. potential surgical intervention.     Reviewed in depth that I do a lot of believe a lot of her symptoms are coming from femoral acetabular impingement and a labrum tear which there is evidence for on her recent arthrogram.  We discussed surgical and nonsurgical treatment.  I think ultimately she would do well with arthroscopy labral repair and osteochondral plasty.  She is a little bit hesitant to consider this at this time and would like to continue on with some further dedicated therapy with related to her hip.  I think would also be useful to see how she responds with the therapy related to her back.  Will see her back in 2 to 3 months to see how she was doing.  All questions were answered she is at this point.

## 2024-08-22 ENCOUNTER — EVALUATION (OUTPATIENT)
Dept: PHYSICAL THERAPY | Facility: CLINIC | Age: 24
End: 2024-08-22
Payer: COMMERCIAL

## 2024-08-22 DIAGNOSIS — M25.852 FEMOROACETABULAR IMPINGEMENT OF LEFT HIP: Primary | ICD-10-CM

## 2024-08-22 PROCEDURE — 97161 PT EVAL LOW COMPLEX 20 MIN: CPT | Mod: GP

## 2024-08-22 PROCEDURE — 97110 THERAPEUTIC EXERCISES: CPT | Mod: GP

## 2024-08-22 NOTE — PROGRESS NOTES
"Physical Therapy  Physical Therapy Orthopedic Evaluation    Patient Name: Sara Benton \"Krista"  MRN: [id number]  Today's Date: 8/22/2024    Insurance:  Visit number: 1 of 60  Authorization info: No  Insurance Type: Payor: [employer] / Plan: [employer] PLAN / Product Type: *No Product type* /    Current Problem  Problem List Items Addressed This Visit             ICD-10-CM    Femoroacetabular impingement of left hip - Primary M25.852    Relevant Orders    Follow Up In Physical Therapy     General:  General  Reason for Referral: L hip pain  Referred By: Dr. Jennings  Precautions:  Precautions  Precautions Comment: None to PT  Medical History Form: Reviewed (scanned into chart)    Subjective:   RACHEAL: Pt reports to evaluation due to L hip pain and lumbar radicular pain. Pt had an MRI that revealed a L labral tear. Pt has had hip pain for well over a year now but noticed it get worse after she went for a run. Pt will mainly feel it now when she is at work/ after a long work shift. Her pain has effected her ability to exercise regularly. Pt is open to surgery to repair her labrum.     Previous Med Management: Stretching, injection in her hip, dry needling     PMH: Ankle fracture    Aggravating Factors: Walking and standing on it for too long, lateral movements, bending over, driving for longer periods, laying on her side.     Relieving Factors: Sitting in reclined position, laying flat on her back or on stomach     Pain/Symptom Scale:  Current: Hip: 6/10 Back: 4/10  Worst: 8/10 Back: 6/10  Best: 3/10/10 Back: 2/10  Location/Nature: Low back is radicular symptoms down the leg and describes her hip as stabbing   Meds: Gabapentin     PLOF: Prior to March 2023 pt was able to perform all activities   ADL: When on her feet for too long she has some pain   Work: Works at a restaurant   Athletics:  basketball and fitness routine   Recreation/ Hobbies: Coaching     Goals: Pt would like to decrease her pain and get back to all " activities     Language: English      Red Flags: Do you have any of the following? No  Fever/chills, unexplained weight changes, dizziness/fainting, unexplained change in bowel or bladder functions, unexplained malaise or muscle weakness, night pain/sweats, numbness or tingling    Objective   Palpation/ Observation   Increased L hip muscular tightness upon palpation. Pt had a decrease in discomfort when long axis distraction and lateral hip mobilizations were performed.     Hip MMT  Hip flexion- R: 4+/5  L: 4+/5  Hip abduction- R: 4+/5 L:  4+/5  Hip adduction- R: 4+/5 L:  4+/5  Hip extension- R: 4+/5 L: 4+/5      Hip ROM   All WFL but had pinching and pain in end range flexion of L hip     Outcome Measures:  Other Measures  Lower Extremity Funtional Score (LEFS): 50/80     Treatments:  Access Code: [email or web address]  URL: [email or web address]  Date: 08/22/2024  Prepared by: Lefty Goodwin    Exercises  - Standing Lumbar Extension with Counter  - 1 x daily - 7 x weekly - 3 sets - 10 reps  - Static Prone on Elbows  - 1 x daily - 7 x weekly - 3 sets - 10 reps  - Hooklying Clamshell with Resistance  - 1 x daily - 7 x weekly - 3 sets - 10 reps  - Supine Bridge  - 1 x daily - 7 x weekly - 3 sets - 10 reps  - Supine Hip Adduction Isometric with Ball  - 1 x daily - 7 x weekly - 3 sets - 10 reps    EDUCATION: Home exercise program, plan of care, activity modifications, pain management, and injury pathology       Assessment:     Patient is a 24 year old female that presents to physical therapy evaluation today due to complaints of L hip pain. Patient impairments include flexibility deficits of L hip in end range, strength deficits in gluteal musculature, and motor control deficits including lack of pelvic control. Due to these impairments, the patients has the following functional limitations: pain with lateral movements, difficulty working her job without pain, and an overall decrease in functional mobility.  Skilled therapy recommended in order to to address their impairments and progress towards the associated functional goals. Prognosis is fair secondary to their current presentation and client understanding. The pt demonstrates a good understanding of their current plan of care, rehab expectations, and prognosis.       Plan:     Planned Interventions include: therapeutic exercise, self-care home management, manual therapy, therapeutic activities, gait training, neuromuscular coordination, vasopneumatic, dry needling, aquatic therapy  Frequency: 1 x Week  Duration: 6 Weeks  Goals: Set and discussed today  Active       PT Problem       PT Goals       Start:  08/27/24       1. Pt will increase LEFS with 65/80 or better in order to demo an increase in L knee function  2. Pt will demo 5/5 or all hip/knee MMT in order to demo an increase in LE strength   3. Pt will demo compliance and independence with HEP   4.  Pt will be able to jog for 30 mins without any increase in hip pain  5. Pt will be able to perform full fitness routine without any increase in L hip pain                Plan of care was developed with input and agreement by the patient      Time Calculation  Start Time: 0830  Stop Time: 0915  Time Calculation (min): 45 min  PT Evaluation Time Entry  PT Evaluation (Low) Time Entry: 25 PT Therapeutic Procedures Time Entry  Therapeutic Exercise Time Entry: 15

## 2024-08-23 ENCOUNTER — TELEPHONE (OUTPATIENT)
Dept: ORTHOPEDIC SURGERY | Facility: HOSPITAL | Age: 24
End: 2024-08-23
Payer: COMMERCIAL

## 2024-08-23 NOTE — TELEPHONE ENCOUNTER
Left a message for Sara to call back regarding setting up an appointment with Dr. Chávez for her L hip.

## 2024-08-27 ASSESSMENT — ENCOUNTER SYMPTOMS
DEPRESSION: 0
LOSS OF SENSATION IN FEET: 0
OCCASIONAL FEELINGS OF UNSTEADINESS: 0

## 2024-08-28 ENCOUNTER — APPOINTMENT (OUTPATIENT)
Dept: ORTHOPEDIC SURGERY | Facility: HOSPITAL | Age: 24
End: 2024-08-28
Payer: COMMERCIAL

## 2024-09-04 ENCOUNTER — OFFICE VISIT (OUTPATIENT)
Dept: ORTHOPEDIC SURGERY | Facility: HOSPITAL | Age: 24
End: 2024-09-04
Payer: COMMERCIAL

## 2024-09-04 DIAGNOSIS — S73.192A TEAR OF ACETABULAR LABRUM, LEFT, INITIAL ENCOUNTER: ICD-10-CM

## 2024-09-04 DIAGNOSIS — M25.852 FEMOROACETABULAR IMPINGEMENT OF LEFT HIP: Primary | ICD-10-CM

## 2024-09-04 PROCEDURE — 99214 OFFICE O/P EST MOD 30 MIN: CPT | Performed by: ORTHOPAEDIC SURGERY

## 2024-09-04 NOTE — PROGRESS NOTES
"HPI  This is a pleasant 24 y.o. female here today, with her father, for second opinion left hip pain.  She states that the pain began in March 2023 while away on spring break she went for a run with her brother on gravel and felt a \"pop\" over the anterior aspect of her hip.  She was seen previously by Dr. Jennings for her left hip where x-rays and an MRI were performed.  Additionally, she had a L4-L5, L5-L1 disc bulge and sciatica occurring around the same period of time that was treated by Dr. Perez with formal 12-week course of physical therapy and gabapentin use with some relief of her symptoms posteriorly.  She has also had an injection by Dr. Hardy into the left hip joint under ultrasound guidance which she feels gave her about 45% relief for 6 weeks but is still uncertain on the exact amount of symptomatic relief she has experienced due to some increased pain from the injection.  Today, she reports a stabbing sensation over the anterior and posterior hip that is worsened with cutting, change of direction squatting and prolonged sitting.  She feels like she has lost mobility of her left hip.  Dr. Jennings provided her a prescription of naproxen.  She denies any numbness or tingling into the bilateral lower extremities.  She feels that the anterior hip pain is worse than the posterior today.  She has an appoint with Dr. Perez on 9/9/2024 for a follow-up for her low back.  They present for treatment recommendations.        Past Medical History:   Diagnosis Date    Other abnormalities of gait and mobility 10/21/2015    Poor balance    Pain in right wrist 10/18/2019    Right wrist pain    Pain in unspecified ankle and joints of unspecified foot 09/29/2015    Ankle pain    Peroneal tendinitis, left leg 11/18/2015    Peroneal tendinitis of left lower extremity       No past surgical history on file.    Social History     Tobacco Use    Smoking status: Never    Smokeless tobacco: Never   Vaping Use    Vaping status: " Never Used   Substance Use Topics    Alcohol use: Yes     Alcohol/week: 4.0 standard drinks of alcohol     Types: 4 Standard drinks or equivalent per week    Drug use: Never          ROS  Review of systems reviewed and pertinent positives mentioned in HPI.      PHYSICAL EXAM  There is not  pain with a resisted situp.    There is not abdominal distention or tenderness    The patient's range of motion reveals that they have 90° of hip flexion on the affected side.   ER 70 degrees.   IR 20 degrees  Hip extension to 10°   Abduction to 45°      The patient is 5 out of 5 strength with resisted hip AB, adduction, hamstring and quadriceps testing.    No pain over the hip flexor, ASIS.  No pain over the proximal hamstring, piriformis      Pain Provacation testing:    Positive impingement sign,with a painful arc from 12 to 3:00.  Negative Psoas impingement/Renu test  Negative instability, Log roll.  Positive subspine impingement test, which is pain with straight hip flexion.    Negative straight leg raise.  Negative circumduction clunk.      Peritrochanteric space examination:  Tenderness over the [place]-trochanteric space - Yes  pain over the posterior trochanter - Yes      IMAGING  X-rays reviewed reveal no gross fracture or dislocation. and evidence of femoral acetabular impingement with an alpha angle of 65.7.  Acetabular index of 2.2  without acetabular retroversion.  Lateral center edge of 29.9.    MRI reviewed reveals tearing of the acetabular labrum      ASSESSMENT/PLAN  This is a 24 y.o. 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  We discussed her conservative and surgical treatment options.  Ultimately, we feel that her back symptoms are well under  control and noncontributory to her current symptomatology.  We encouraged her to continue her follow-up appointment with  on 9/9/2024 for continuity of care.  We feel that she does have some mixed morphology with an increased pincer lesion on the acetabular side and cam morphology on the femoral neck.  There is evidence of increased sclerosis of the femoral neck.  She has had a corticosteroid injection into the left hip performed in summer 2023 which she has mixed reviews on efficacy.  We discussed the use of a diagnostic lidocaine injection only into the left hip.  We encouraged her to perform activities that are known to cause an increased hip pain and discomfort and then have the lidocaine only injection to see how much symptomatic relief she obtains.  We have her arranged to see my physician assistant Zonia Salazar for the injection on 9/16/2024.  If the pain relief is quite significant following the lidocaine only injection we could discuss surgical intervention options such as a hip arthroscopy.  We are happy to assist with her care moving forward if she desires to do so.  They are in agreement the plan.  Her questions have been answered.

## 2024-09-09 ENCOUNTER — APPOINTMENT (OUTPATIENT)
Dept: ORTHOPEDIC SURGERY | Facility: CLINIC | Age: 24
End: 2024-09-09
Payer: COMMERCIAL

## 2024-09-11 ENCOUNTER — TREATMENT (OUTPATIENT)
Dept: PHYSICAL THERAPY | Facility: CLINIC | Age: 24
End: 2024-09-11
Payer: COMMERCIAL

## 2024-09-11 DIAGNOSIS — M25.852 FEMOROACETABULAR IMPINGEMENT OF LEFT HIP: Primary | ICD-10-CM

## 2024-09-11 PROCEDURE — 97110 THERAPEUTIC EXERCISES: CPT | Mod: GP

## 2024-09-11 PROCEDURE — 97140 MANUAL THERAPY 1/> REGIONS: CPT | Mod: GP

## 2024-09-11 NOTE — PROGRESS NOTES
Physical Therapy  Physical Therapy Treatment Note    Patient Name: Sara Benton  MRN: [id number]  Today's Date: 9/11/24  Time Calculation  Start Time: 1145  Stop Time: 1230  Time Calculation (min): 45 min  PT Therapeutic Procedures Time Entry  Manual Therapy Time Entry: 15  Therapeutic Exercise Time Entry: 25        Insurance:  Visit number: 2 of 60  Authorization info: No auth   Insurance Type: Payor: [employer] / Plan: [employer] HEALTH [employer]AN / Product Type: *No Product type* /   Current Problem  1. Femoroacetabular impingement of left hip          General  Reason for Referral: L hip pain  Referred By: Dr. Jennings  Precautions  Precautions  Precautions Comment: None to PT  Subjective:     Patient reports that her hip has been bothering her since last visit. Pt is having an injection next Monday as a diagnostic tool. Pt also met with Dr. Chávez who informed her that she would be a surgical candidate if she decides to go that route.   Pain     Objective:   Palpation/ Observation   Increased L hip muscular tightness upon palpation. Pt had a decrease in discomfort when long axis distraction and lateral hip mobilizations were performed.      Hip MMT  Hip flexion- R: 4+/5  L: 4+/5  Hip abduction- R: 4+/5 L:  4+/5  Hip adduction- R: 4+/5 L:  4+/5  Hip extension- R: 4+/5 L: 4+/5       Hip ROM   All WFL but had pinching and pain in end range flexion of L hip      Outcome Measures:  Other Measures  Lower Extremity Funtional Score (LEFS): 50/80   Treatments:     THERE EX  PROM of L hip   Repeated side plank 3 x 10   Banded adduction holds 3 x 5       MANUAL  Belted hip mobilizations   Long axis distraction   AP mobs to L hip     Assessment:  Pt tolerated session well. Pt had a decrease in pain after belted hip mobilizations were performed. Pt's hip abduction and adduction exercises were changed due to the pain she was having with her previous exercises. Pt continues to progress towards goals established in the POC and should continue  with skilled therapy.       Plan: Continue to work on hip flexibility and strengthening      Goals:  Active       PT Problem       PT Goals       Start:  08/27/24       1. Pt will increase LEFS with 65/80 or better in order to demo an increase in L knee function  2. Pt will demo 5/5 or all hip/knee MMT in order to demo an increase in LE strength   3. Pt will demo compliance and independence with HEP   4.  Pt will be able to jog for 30 mins without any increase in hip pain  5. Pt will be able to perform full fitness routine without any increase in L hip pain

## 2024-09-16 ENCOUNTER — APPOINTMENT (OUTPATIENT)
Dept: ORTHOPEDIC SURGERY | Facility: HOSPITAL | Age: 24
End: 2024-09-16
Payer: COMMERCIAL

## 2024-09-16 ENCOUNTER — TELEPHONE (OUTPATIENT)
Dept: ORTHOPEDIC SURGERY | Facility: HOSPITAL | Age: 24
End: 2024-09-16
Payer: COMMERCIAL

## 2024-09-20 ENCOUNTER — HOSPITAL ENCOUNTER (OUTPATIENT)
Dept: RADIOLOGY | Facility: EXTERNAL LOCATION | Age: 24
Discharge: HOME | End: 2024-09-20

## 2024-09-20 ENCOUNTER — OFFICE VISIT (OUTPATIENT)
Dept: ORTHOPEDIC SURGERY | Facility: HOSPITAL | Age: 24
End: 2024-09-20
Payer: COMMERCIAL

## 2024-09-20 DIAGNOSIS — M25.852 FEMOROACETABULAR IMPINGEMENT OF LEFT HIP: Primary | ICD-10-CM

## 2024-09-20 DIAGNOSIS — S73.192A TEAR OF LEFT ACETABULAR LABRUM, INITIAL ENCOUNTER: ICD-10-CM

## 2024-09-20 PROCEDURE — 20611 DRAIN/INJ JOINT/BURSA W/US: CPT | Mod: LT | Performed by: PHYSICIAN ASSISTANT

## 2024-09-20 PROCEDURE — 2500000005 HC RX 250 GENERAL PHARMACY W/O HCPCS: Performed by: PHYSICIAN ASSISTANT

## 2024-09-20 PROCEDURE — 99213 OFFICE O/P EST LOW 20 MIN: CPT | Mod: 25 | Performed by: PHYSICIAN ASSISTANT

## 2024-09-20 PROCEDURE — 99213 OFFICE O/P EST LOW 20 MIN: CPT | Performed by: PHYSICIAN ASSISTANT

## 2024-09-20 NOTE — PROGRESS NOTES
"DIAGNOSTIC INJECTION    HPI    24 y.o. female here today for follow up on Left hip pain.  She has evidence of femoral acetabular impingement and labral pathology.  Positive impingement sign.  She also has a component of some low back pain.  Dr. Orlando had suggested diagnostic intra-articular injection and she would like to proceed with this today.            Patient ID: Sara Benton \"Krista" is a 24 y.o. female.    L Inj/Asp: L hip joint on 9/20/2024 11:43 AM  Indications: pain  Details: 20 G needle, ultrasound-guided anterior approach  Medications: 5 mL lidocaine 10 mg/mL (1 %)  Procedure, treatment alternatives, risks and benefits explained, specific risks discussed. Consent was given by the patient. Immediately prior to procedure a time out was called to verify the correct patient, procedure, equipment, support staff and site/side marked as required. Patient was prepped and draped in the usual sterile fashion.           ASSESSMENT/PLAN  Patient has exhausted conservative management including therapeutic exercises, activity modification, NSAIDS.  They continue to have worsening deep groin pain with mechanical symptoms affecting ADLs.  Patient would like to proceed with surgery entailing a hip arthroscopy, acetabuloplasty for acetabular retroversion, labral repair, femoroplasty, loose body removal for possible cartilaginous loose body seen on MRI, and capsular plication for mild hip instability.    We discussed the risks and benefits of surgery which included but were not limited to bleeding, infection, damage to nerves, damage to blood vessels, need for further procedures, risks of anesthesia, heterotopic ossification, femoral neck stress fracture, avascular necrosis of the femoral head, pudendal nerve palsy iatrogenic instability progression of osteoarthritis.    Patient was prescribed a hinged hip brace for ABIEL/labral tear.  The patient is ambulatory with or without aid; but, has weakness, instability " and/or deformity of their left hip which requires stabilization from this orthosis to improve their function.      Verbal and written instructions for the use, wear schedule, cleaning and application of this item were given.  Patient was instructed that should the brace result in increased pain, decreased sensation, increased swelling, or an overall worsening of their medical condition, to please contact our office immediately.     Patient was prescribed crutches for ABIEL/labral tear.  This mobility device is required for the following reasons:    1. The patient has a mobility limitation that significantly impairs their ability to participate in one or more mobility-related activities of daily living (MRADL) in the home; and  2. The patient is able to safely use the mobility device; and  3. The functional mobility deficit can be sufficiently resolved with use of the mobility device    Verbal and written instructions for the use, wear schedule, cleaning and application of this item were given.  Education provided also included gait training and safety precautions when using this device. Patient was instructed that should the item result in increased pain, decreased sensation, increased swelling, or an overall worsening of their medical condition, to please contact our office immediately.    Orthotic management and training was provided for skin care, modifications due to healing tissues, edema changes, interruption in skin integrity, and safety precautions with the orthosis.       Patient has significant groin pain and catching that limits ADLs - Yes    Pain worsened with prolonged sitting - yes    Minimum of 3 months of treatment/therapeutic exercises -  Yes    Intra-articular injection with temporary relief - Yes    Positive impingement sign - Yes    X-ray confirming femoral acetabular impingement with elevated alpha angle - yes, alpha angle 65    Growth plates closed - Yes    Imaging confirming acetabular dysplasia  - No    MRI confirming labral tearing - yes    Tonnis grade - 0      Zonia Salazar PA-C      I was present for all key portions of the history and physical examination of this patient who was seen in conjunction with my physician's assistant. Together we formulated the treatment plan of care.       Kobi Chávez MD

## 2024-09-23 ENCOUNTER — APPOINTMENT (OUTPATIENT)
Dept: PHYSICAL THERAPY | Facility: CLINIC | Age: 24
End: 2024-09-23
Payer: COMMERCIAL

## 2024-09-23 RX ORDER — LIDOCAINE HYDROCHLORIDE 10 MG/ML
5 INJECTION, SOLUTION INFILTRATION; PERINEURAL
Status: COMPLETED | OUTPATIENT
Start: 2024-09-20 | End: 2024-09-20

## 2024-09-30 PROBLEM — M24.052 LOOSE BODY IN LEFT HIP: Status: ACTIVE | Noted: 2024-09-27

## 2024-09-30 PROBLEM — S73.192A TEAR OF LEFT ACETABULAR LABRUM: Status: ACTIVE | Noted: 2024-09-27

## 2024-10-03 ENCOUNTER — TREATMENT (OUTPATIENT)
Dept: PHYSICAL THERAPY | Facility: CLINIC | Age: 24
End: 2024-10-03
Payer: COMMERCIAL

## 2024-10-03 DIAGNOSIS — M25.852 FEMOROACETABULAR IMPINGEMENT OF LEFT HIP: Primary | ICD-10-CM

## 2024-10-03 PROCEDURE — 97032 APPL MODALITY 1+ESTIM EA 15: CPT | Mod: GP

## 2024-10-03 PROCEDURE — 97140 MANUAL THERAPY 1/> REGIONS: CPT | Mod: GP

## 2024-10-03 NOTE — PROGRESS NOTES
Physical Therapy  Physical Therapy Treatment Note    Patient Name: Sara Benton  MRN: [id number]  Today's Date: 9/11/24  Time Calculation  Start Time: 1400  Stop Time: 1445  Time Calculation (min): 45 min  PT Therapeutic Procedures Time Entry  Manual Therapy Time Entry: 30  PT Modalities Time Entry  E-Stim (Attended) Time Entry: 10     Insurance:  Visit number: 3 of 60  Authorization info: No auth   Insurance Type: Payor: [employer] / Plan: [employer] HEALTH PLAN / Product Type: *No Product type* /   Current Problem  1. Femoroacetabular impingement of left hip  Follow Up In Physical Therapy    Follow Up In Physical Therapy        General  Reason for Referral: L hip pain  Referred By: Dr. Jennings  Precautions  Precautions  Precautions Comment: None to PT  Subjective:     Patient is set up to have a labral repair on 11/5. Pt has been having some increased hip discomfort with the amount she has had to work.   Pain     Objective:   Palpation/ Observation   Increased L hip muscular tightness upon palpation. Pt had a decrease in discomfort when long axis distraction and lateral hip mobilizations were performed.      Hip MMT  Hip flexion- R: 4+/5  L: 4+/5  Hip abduction- R: 4+/5 L:  4+/5  Hip adduction- R: 4+/5 L:  4+/5  Hip extension- R: 4+/5 L: 4+/5       Hip ROM   All WFL but had pinching and pain in end range flexion of L hip      Outcome Measures:  Other Measures  Lower Extremity Funtional Score (LEFS): 50/80   Treatments:     THERE EX  PROM of L hip       MANUAL  Belted hip mobilizations   Long axis distraction   AP mobs to L hip     Theraprobe to lumbar/thoracic spine x 10 mins with heat and stim @ 6     Assessment:  Pt tolerated session well. Pt had a decrease in back discomfort after use of theraprobe. Pt continued to get relief from hip mobilizations. Pt was educated to get scheduled for her post op visits. Pt continues to progress towards goals established in the POC and should continue with skilled therapy.          Plan: Continue to work on hip flexibility and strengthening      Goals:  Active       PT Problem       PT Goals       Start:  08/27/24       1. Pt will increase LEFS with 65/80 or better in order to demo an increase in L knee function  2. Pt will demo 5/5 or all hip/knee MMT in order to demo an increase in LE strength   3. Pt will demo compliance and independence with HEP   4.  Pt will be able to jog for 30 mins without any increase in hip pain  5. Pt will be able to perform full fitness routine without any increase in L hip pain

## 2024-10-14 ENCOUNTER — APPOINTMENT (OUTPATIENT)
Dept: OBSTETRICS AND GYNECOLOGY | Facility: CLINIC | Age: 24
End: 2024-10-14
Payer: COMMERCIAL

## 2024-10-14 ENCOUNTER — APPOINTMENT (OUTPATIENT)
Dept: PHYSICAL THERAPY | Facility: CLINIC | Age: 24
End: 2024-10-14
Payer: COMMERCIAL

## 2024-10-17 ENCOUNTER — CLINICAL SUPPORT (OUTPATIENT)
Dept: PREADMISSION TESTING | Facility: HOSPITAL | Age: 24
End: 2024-10-17
Payer: COMMERCIAL

## 2024-10-17 DIAGNOSIS — S73.192D TEAR OF LEFT ACETABULAR LABRUM, SUBSEQUENT ENCOUNTER: ICD-10-CM

## 2024-10-17 DIAGNOSIS — M25.852 FEMOROACETABULAR IMPINGEMENT OF LEFT HIP: ICD-10-CM

## 2024-10-17 RX ORDER — CHOLECALCIFEROL (VITAMIN D3) 125 MCG
220 CAPSULE ORAL 2 TIMES DAILY PRN
COMMUNITY

## 2024-10-17 RX ORDER — ACETAMINOPHEN 500 MG
1000 TABLET ORAL 2 TIMES DAILY PRN
COMMUNITY

## 2024-10-17 NOTE — PREPROCEDURE INSTRUCTIONS
Current Medications   Medication Instructions    acetaminophen (Tylenol) 500 mg tablet Continue as needed    FLUoxetine (PROzac) 20 mg capsule Continue    gabapentin (Neurontin) 300 mg capsule Continue    naproxen sodium (Aleve) 220 mg capsule HOLD 7 days before surgery                       NPO Instructions:    Do not eat any food after midnight the night before your surgery/procedure.    Additional Instructions:     Seven/Six Days before Surgery:  Review your medication instructions, stop indicated medications  Five Days before Surgery:  Review your medication instructions, stop indicated medications  Three Days before Surgery:  Review your medication instructions, stop indicated medications  The Day before Surgery:  Review your medication instructions, stop indicated medications  You will be contacted regarding the time of your arrival to facility and surgery time  Do not eat any food after Midnight  Day of Surgery:  Review your medication instructions, take indicated medications  Wear  comfortable loose fitting clothing  Do not use moisturizers, creams, lotions or perfume  All jewelry and valuables should be left at home    PAT PRE-OPERATIVE INSTRUCTIONS    [facility]  [address] [place] [address]  [phone]    Please let your surgeon know if:      You develop:  Open sores, shingles, burning or painful urination as these may increase your risk of an infection.   Fever=100.4 or greater   New or worsening cold or flu symptoms ( cough, shortness of breath, sore throat, respiratory distress, headache, fatigue, GI symptoms)   You no longer wish to have the surgery.   Any other personal circumstances change that may lead to the need to cancel or defer this surgery-such as being sick or getting admitted to any hospital within one week of your planned procedure.    Your contact details change, such as a change of address or phone number.    Starting now:     Please DO  NOT drink alcohol or smoke for 24 hours before surgery. It is well known that quitting smoking can make a huge difference to your health and recovery from surgery. The longer you abstain from smoking, the better your chances of a healthy recovery. If you need help with quitting, call 8-800QUIT-NOW to be connected to a trained counselor who will discuss the best methods to help you quit.     Before your surgery:    Please stop all supplements/ vitamins 7 days prior to surgery (or as directed by your surgeon).   Please stop taking NSAID pain medicine such as Advil, Ibuprofen, and Motrin 7 days before surgery.    If you develop any fever, cough, cold, rashes, cuts, scratches, scrapes, urinary symptoms or infection anywhere on your body (including teeth and gums) prior to surgery, please call your surgeon’s office as soon as possible. This may require treatment to reduce the chance of cancellation on the day of surgery.    The day before your surgery:   DIET- Do not eat any food after MIDNIGHT.   Get a good night’s rest.  Use the special soap for bathing if you have been instructed to use one.    Scheduled surgery times may change and you will be notified if this occurs - please check your personal voicemail for any updates.     On the morning of surgery:   Wear comfortable, loose fitting clothes which open in the front.   Shower and please do not wear moisturizers, creams, lotions, deodorants, makeup or perfume.    Please bring with you to surgery:   Photo ID and insurance card   Current list of medicines and allergies   Pacemaker/ Defibrillator/Heart stent cards as well as remote controls for implanted devices    CPAP machine and mask    Slings/ splints/ crutches   A copy of your complete advanced directive/DHPOA.    Please do NOT bring with you to surgery:   All jewelry and valuables should be left at home.   Prosthetic devices such as contact lenses, glasses, hearing aids, dentures, eyelash extensions, hairpins and  body piercings must be removed prior to going in to the surgical suite. If you have a case for these items, please bring it with you on surgery day.    *Patients under the age of 18: A responsible adult must be present and remaining in the building throughout the surgical visit.    After outpatient surgery:   A responsible adult MUST accompany you at the time of discharge and stay with you for 24 hours after your surgery. You may NOT drive yourself home after surgery.    Do not drive, operate machinery, make critical decisions or do activities that require co-ordination or balance until after a night’s sleep.   Do not drink alcoholic beverages for 24 hours.   Instructions for resuming your medications will be provided by your surgeon.    CALL YOUR DOCTOR AFTER SURGERY IF YOU HAVE:     Chills and/or a fever of 101° F or higher.    Redness, swelling, pus or drainage from your surgical wound or a bad smell from the wound.    Lightheadedness, fainting or confusion.    Persistent vomiting (throwing up) and are not able to eat or drink for 12 hours.    Three or more loose, watery bowel movements in 24 hours (diarrhea).   Difficulty or pain while urinating( after non-urological surgery)    Pain and swelling in your legs, especially if it is only on one side.    Difficulty breathing or are breathing faster than normal.    Any new concerning symptoms.      Reviewed pre-op instructions with patient, states understanding and denies further questions at this time.      Take Care Laure!

## 2024-11-05 ENCOUNTER — HOSPITAL ENCOUNTER (OUTPATIENT)
Facility: HOSPITAL | Age: 24
Setting detail: OUTPATIENT SURGERY
Discharge: HOME | End: 2024-11-05
Attending: ORTHOPAEDIC SURGERY | Admitting: ORTHOPAEDIC SURGERY
Payer: COMMERCIAL

## 2024-11-05 ENCOUNTER — APPOINTMENT (OUTPATIENT)
Dept: RADIOLOGY | Facility: HOSPITAL | Age: 24
End: 2024-11-05
Payer: COMMERCIAL

## 2024-11-05 ENCOUNTER — ANESTHESIA (OUTPATIENT)
Dept: OPERATING ROOM | Facility: HOSPITAL | Age: 24
End: 2024-11-05
Payer: COMMERCIAL

## 2024-11-05 ENCOUNTER — ANESTHESIA EVENT (OUTPATIENT)
Dept: OPERATING ROOM | Facility: HOSPITAL | Age: 24
End: 2024-11-05
Payer: COMMERCIAL

## 2024-11-05 VITALS
HEART RATE: 75 BPM | TEMPERATURE: 98.2 F | DIASTOLIC BLOOD PRESSURE: 84 MMHG | SYSTOLIC BLOOD PRESSURE: 120 MMHG | OXYGEN SATURATION: 100 % | RESPIRATION RATE: 16 BRPM

## 2024-11-05 DIAGNOSIS — S73.192A TEAR OF LEFT ACETABULAR LABRUM, INITIAL ENCOUNTER: ICD-10-CM

## 2024-11-05 DIAGNOSIS — M25.852 FEMOROACETABULAR IMPINGEMENT OF LEFT HIP: ICD-10-CM

## 2024-11-05 DIAGNOSIS — M24.052 LOOSE BODY IN LEFT HIP: Primary | ICD-10-CM

## 2024-11-05 PROBLEM — F32.A DEPRESSION: Status: ACTIVE | Noted: 2024-11-05

## 2024-11-05 LAB — HCG UR QL IA.RAPID: NEGATIVE

## 2024-11-05 PROCEDURE — 7100000002 HC RECOVERY ROOM TIME - EACH INCREMENTAL 1 MINUTE: Performed by: ORTHOPAEDIC SURGERY

## 2024-11-05 PROCEDURE — C1713 ANCHOR/SCREW BN/BN,TIS/BN: HCPCS | Performed by: ORTHOPAEDIC SURGERY

## 2024-11-05 PROCEDURE — 29999 UNLISTED PX ARTHROSCOPY: CPT | Performed by: ORTHOPAEDIC SURGERY

## 2024-11-05 PROCEDURE — 29861 HIP ARTHRO W/FB REMOVAL: CPT | Performed by: ORTHOPAEDIC SURGERY

## 2024-11-05 PROCEDURE — 64999 UNLISTED PX NERVOUS SYSTEM: CPT | Performed by: STUDENT IN AN ORGANIZED HEALTH CARE EDUCATION/TRAINING PROGRAM

## 2024-11-05 PROCEDURE — 2500000004 HC RX 250 GENERAL PHARMACY W/ HCPCS (ALT 636 FOR OP/ED)

## 2024-11-05 PROCEDURE — 2500000001 HC RX 250 WO HCPCS SELF ADMINISTERED DRUGS (ALT 637 FOR MEDICARE OP): Performed by: ANESTHESIOLOGY

## 2024-11-05 PROCEDURE — 29915 HIP ARTHRO ACETABULOPLASTY: CPT | Performed by: ORTHOPAEDIC SURGERY

## 2024-11-05 PROCEDURE — 2500000004 HC RX 250 GENERAL PHARMACY W/ HCPCS (ALT 636 FOR OP/ED): Performed by: ORTHOPAEDIC SURGERY

## 2024-11-05 PROCEDURE — 3600000009 HC OR TIME - EACH INCREMENTAL 1 MINUTE - PROCEDURE LEVEL FOUR: Performed by: ORTHOPAEDIC SURGERY

## 2024-11-05 PROCEDURE — 2500000004 HC RX 250 GENERAL PHARMACY W/ HCPCS (ALT 636 FOR OP/ED): Performed by: ANESTHESIOLOGIST ASSISTANT

## 2024-11-05 PROCEDURE — 81025 URINE PREGNANCY TEST: CPT | Performed by: ORTHOPAEDIC SURGERY

## 2024-11-05 PROCEDURE — A29916 PR ARTHROSCOPY HIP W/LABRAL REPAIR: Performed by: ANESTHESIOLOGY

## 2024-11-05 PROCEDURE — 29914 HIP ARTHRO W/FEMOROPLASTY: CPT | Performed by: ORTHOPAEDIC SURGERY

## 2024-11-05 PROCEDURE — 2500000004 HC RX 250 GENERAL PHARMACY W/ HCPCS (ALT 636 FOR OP/ED): Mod: JZ | Performed by: SPECIALIST/TECHNOLOGIST

## 2024-11-05 PROCEDURE — A29916 PR ARTHROSCOPY HIP W/LABRAL REPAIR: Performed by: ANESTHESIOLOGIST ASSISTANT

## 2024-11-05 PROCEDURE — 7100000010 HC PHASE TWO TIME - EACH INCREMENTAL 1 MINUTE: Performed by: ORTHOPAEDIC SURGERY

## 2024-11-05 PROCEDURE — 7100000009 HC PHASE TWO TIME - INITIAL BASE CHARGE: Performed by: ORTHOPAEDIC SURGERY

## 2024-11-05 PROCEDURE — 2500000004 HC RX 250 GENERAL PHARMACY W/ HCPCS (ALT 636 FOR OP/ED): Performed by: STUDENT IN AN ORGANIZED HEALTH CARE EDUCATION/TRAINING PROGRAM

## 2024-11-05 PROCEDURE — 2500000001 HC RX 250 WO HCPCS SELF ADMINISTERED DRUGS (ALT 637 FOR MEDICARE OP): Performed by: SPECIALIST/TECHNOLOGIST

## 2024-11-05 PROCEDURE — 3700000001 HC GENERAL ANESTHESIA TIME - INITIAL BASE CHARGE: Performed by: ORTHOPAEDIC SURGERY

## 2024-11-05 PROCEDURE — 2780000003 HC OR 278 NO HCPCS: Performed by: ORTHOPAEDIC SURGERY

## 2024-11-05 PROCEDURE — 2720000007 HC OR 272 NO HCPCS: Performed by: ORTHOPAEDIC SURGERY

## 2024-11-05 PROCEDURE — 3700000002 HC GENERAL ANESTHESIA TIME - EACH INCREMENTAL 1 MINUTE: Performed by: ORTHOPAEDIC SURGERY

## 2024-11-05 PROCEDURE — 7100000001 HC RECOVERY ROOM TIME - INITIAL BASE CHARGE: Performed by: ORTHOPAEDIC SURGERY

## 2024-11-05 PROCEDURE — 3600000004 HC OR TIME - INITIAL BASE CHARGE - PROCEDURE LEVEL FOUR: Performed by: ORTHOPAEDIC SURGERY

## 2024-11-05 DEVICE — SUTURE ANCHOR, FG. NANOTECK TT, 1.4MM WITH 1.2MM XBRAID: Type: IMPLANTABLE DEVICE | Site: HIP | Status: FUNCTIONAL

## 2024-11-05 RX ORDER — SODIUM CHLORIDE, SODIUM LACTATE, POTASSIUM CHLORIDE, CALCIUM CHLORIDE 600; 310; 30; 20 MG/100ML; MG/100ML; MG/100ML; MG/100ML
INJECTION, SOLUTION INTRAVENOUS CONTINUOUS PRN
Status: DISCONTINUED | OUTPATIENT
Start: 2024-11-05 | End: 2024-11-05

## 2024-11-05 RX ORDER — GABAPENTIN 300 MG/1
600 CAPSULE ORAL ONCE
Status: DISCONTINUED | OUTPATIENT
Start: 2024-11-05 | End: 2024-11-05 | Stop reason: HOSPADM

## 2024-11-05 RX ORDER — BUPIVACAINE HYDROCHLORIDE 5 MG/ML
INJECTION, SOLUTION PERINEURAL AS NEEDED
Status: DISCONTINUED | OUTPATIENT
Start: 2024-11-05 | End: 2024-11-05 | Stop reason: HOSPADM

## 2024-11-05 RX ORDER — FENTANYL CITRATE 50 UG/ML
50 INJECTION, SOLUTION INTRAMUSCULAR; INTRAVENOUS EVERY 5 MIN PRN
Status: DISCONTINUED | OUTPATIENT
Start: 2024-11-05 | End: 2024-11-05 | Stop reason: HOSPADM

## 2024-11-05 RX ORDER — ACETAMINOPHEN 325 MG/1
975 TABLET ORAL ONCE
Status: COMPLETED | OUTPATIENT
Start: 2024-11-05 | End: 2024-11-05

## 2024-11-05 RX ORDER — METHOCARBAMOL 750 MG/1
750 TABLET, FILM COATED ORAL 3 TIMES DAILY
Qty: 30 TABLET | Refills: 0 | Status: SHIPPED | OUTPATIENT
Start: 2024-11-05 | End: 2024-11-15

## 2024-11-05 RX ORDER — EPINEPHRINE 1 MG/ML
INJECTION INTRAMUSCULAR; INTRAVENOUS; SUBCUTANEOUS AS NEEDED
Status: DISCONTINUED | OUTPATIENT
Start: 2024-11-05 | End: 2024-11-05 | Stop reason: HOSPADM

## 2024-11-05 RX ORDER — LIDOCAINE HYDROCHLORIDE 10 MG/ML
INJECTION, SOLUTION EPIDURAL; INFILTRATION; INTRACAUDAL; PERINEURAL AS NEEDED
Status: DISCONTINUED | OUTPATIENT
Start: 2024-11-05 | End: 2024-11-05

## 2024-11-05 RX ORDER — ALBUTEROL SULFATE 0.83 MG/ML
2.5 SOLUTION RESPIRATORY (INHALATION) EVERY 30 MIN PRN
Status: DISCONTINUED | OUTPATIENT
Start: 2024-11-05 | End: 2024-11-05 | Stop reason: HOSPADM

## 2024-11-05 RX ORDER — IPRATROPIUM BROMIDE 0.5 MG/2.5ML
500 SOLUTION RESPIRATORY (INHALATION) EVERY 30 MIN PRN
Status: DISCONTINUED | OUTPATIENT
Start: 2024-11-05 | End: 2024-11-05 | Stop reason: HOSPADM

## 2024-11-05 RX ORDER — FENTANYL CITRATE 50 UG/ML
INJECTION, SOLUTION INTRAMUSCULAR; INTRAVENOUS AS NEEDED
Status: DISCONTINUED | OUTPATIENT
Start: 2024-11-05 | End: 2024-11-05

## 2024-11-05 RX ORDER — MIDAZOLAM HYDROCHLORIDE 1 MG/ML
INJECTION, SOLUTION INTRAMUSCULAR; INTRAVENOUS AS NEEDED
Status: DISCONTINUED | OUTPATIENT
Start: 2024-11-05 | End: 2024-11-05

## 2024-11-05 RX ORDER — PHENYLEPHRINE HCL IN 0.9% NACL 1 MG/10 ML
SYRINGE (ML) INTRAVENOUS AS NEEDED
Status: DISCONTINUED | OUTPATIENT
Start: 2024-11-05 | End: 2024-11-05

## 2024-11-05 RX ORDER — SODIUM CHLORIDE, SODIUM LACTATE, POTASSIUM CHLORIDE, CALCIUM CHLORIDE 600; 310; 30; 20 MG/100ML; MG/100ML; MG/100ML; MG/100ML
40 INJECTION, SOLUTION INTRAVENOUS CONTINUOUS
Status: DISCONTINUED | OUTPATIENT
Start: 2024-11-05 | End: 2024-11-05 | Stop reason: HOSPADM

## 2024-11-05 RX ORDER — ASPIRIN 81 MG/1
81 TABLET ORAL 2 TIMES DAILY
Qty: 28 TABLET | Refills: 0 | Status: SHIPPED | OUTPATIENT
Start: 2024-11-06 | End: 2024-11-20

## 2024-11-05 RX ORDER — OXYCODONE HYDROCHLORIDE 5 MG/1
5 TABLET ORAL ONCE AS NEEDED
Status: COMPLETED | OUTPATIENT
Start: 2024-11-05 | End: 2024-11-05

## 2024-11-05 RX ORDER — LABETALOL HYDROCHLORIDE 5 MG/ML
5 INJECTION, SOLUTION INTRAVENOUS EVERY 5 MIN PRN
Status: DISCONTINUED | OUTPATIENT
Start: 2024-11-05 | End: 2024-11-05 | Stop reason: HOSPADM

## 2024-11-05 RX ORDER — INDOMETHACIN 75 MG/1
75 CAPSULE, EXTENDED RELEASE ORAL
Qty: 10 CAPSULE | Refills: 0 | Status: SHIPPED | OUTPATIENT
Start: 2024-11-05 | End: 2024-11-15

## 2024-11-05 RX ORDER — FENTANYL CITRATE 50 UG/ML
50 INJECTION, SOLUTION INTRAMUSCULAR; INTRAVENOUS ONCE
Status: COMPLETED | OUTPATIENT
Start: 2024-11-05 | End: 2024-11-05

## 2024-11-05 RX ORDER — CEFAZOLIN SODIUM 2 G/100ML
2 INJECTION, SOLUTION INTRAVENOUS ONCE
Status: COMPLETED | OUTPATIENT
Start: 2024-11-05 | End: 2024-11-05

## 2024-11-05 RX ORDER — ONDANSETRON HYDROCHLORIDE 2 MG/ML
INJECTION, SOLUTION INTRAVENOUS AS NEEDED
Status: DISCONTINUED | OUTPATIENT
Start: 2024-11-05 | End: 2024-11-05

## 2024-11-05 RX ORDER — ONDANSETRON 4 MG/1
4 TABLET, ORALLY DISINTEGRATING ORAL EVERY 8 HOURS PRN
Qty: 30 TABLET | Refills: 0 | Status: SHIPPED | OUTPATIENT
Start: 2024-11-05 | End: 2024-11-15

## 2024-11-05 RX ORDER — DOCUSATE SODIUM 100 MG/1
100 CAPSULE, LIQUID FILLED ORAL 2 TIMES DAILY
Qty: 30 CAPSULE | Refills: 0 | Status: SHIPPED | OUTPATIENT
Start: 2024-11-05 | End: 2024-11-20

## 2024-11-05 RX ORDER — MORPHINE SULFATE 10 MG/ML
INJECTION, SOLUTION INTRAMUSCULAR; INTRAVENOUS AS NEEDED
Status: DISCONTINUED | OUTPATIENT
Start: 2024-11-05 | End: 2024-11-05 | Stop reason: HOSPADM

## 2024-11-05 RX ORDER — OXYCODONE AND ACETAMINOPHEN 5; 325 MG/1; MG/1
1 TABLET ORAL EVERY 6 HOURS PRN
Qty: 20 TABLET | Refills: 0 | Status: SHIPPED | OUTPATIENT
Start: 2024-11-05 | End: 2024-11-10

## 2024-11-05 RX ORDER — MIDAZOLAM HYDROCHLORIDE 1 MG/ML
2 INJECTION, SOLUTION INTRAMUSCULAR; INTRAVENOUS ONCE
Status: COMPLETED | OUTPATIENT
Start: 2024-11-05 | End: 2024-11-05

## 2024-11-05 RX ORDER — ROCURONIUM BROMIDE 10 MG/ML
INJECTION, SOLUTION INTRAVENOUS AS NEEDED
Status: DISCONTINUED | OUTPATIENT
Start: 2024-11-05 | End: 2024-11-05

## 2024-11-05 RX ORDER — ONDANSETRON HYDROCHLORIDE 2 MG/ML
4 INJECTION, SOLUTION INTRAVENOUS ONCE AS NEEDED
Status: DISCONTINUED | OUTPATIENT
Start: 2024-11-05 | End: 2024-11-05 | Stop reason: HOSPADM

## 2024-11-05 RX ORDER — PROPOFOL 10 MG/ML
INJECTION, EMULSION INTRAVENOUS AS NEEDED
Status: DISCONTINUED | OUTPATIENT
Start: 2024-11-05 | End: 2024-11-05

## 2024-11-05 RX ORDER — HYDROMORPHONE HYDROCHLORIDE 0.2 MG/ML
0.2 INJECTION INTRAMUSCULAR; INTRAVENOUS; SUBCUTANEOUS EVERY 5 MIN PRN
Status: DISCONTINUED | OUTPATIENT
Start: 2024-11-05 | End: 2024-11-05 | Stop reason: HOSPADM

## 2024-11-05 SDOH — HEALTH STABILITY: MENTAL HEALTH: CURRENT SMOKER: 0

## 2024-11-05 ASSESSMENT — PAIN SCALES - GENERAL
PAIN_LEVEL: 5
PAINLEVEL_OUTOF10: 0 - NO PAIN
PAINLEVEL_OUTOF10: 5 - MODERATE PAIN
PAINLEVEL_OUTOF10: 4
PAINLEVEL_OUTOF10: 0 - NO PAIN
PAINLEVEL_OUTOF10: 4
PAINLEVEL_OUTOF10: 4
PAINLEVEL_OUTOF10: 5 - MODERATE PAIN
PAINLEVEL_OUTOF10: 2

## 2024-11-05 ASSESSMENT — PAIN - FUNCTIONAL ASSESSMENT
PAIN_FUNCTIONAL_ASSESSMENT: WONG-BAKER FACES
PAIN_FUNCTIONAL_ASSESSMENT: 0-10

## 2024-11-05 ASSESSMENT — COLUMBIA-SUICIDE SEVERITY RATING SCALE - C-SSRS
1. IN THE PAST MONTH, HAVE YOU WISHED YOU WERE DEAD OR WISHED YOU COULD GO TO SLEEP AND NOT WAKE UP?: NO
2. HAVE YOU ACTUALLY HAD ANY THOUGHTS OF KILLING YOURSELF?: NO
6. HAVE YOU EVER DONE ANYTHING, STARTED TO DO ANYTHING, OR PREPARED TO DO ANYTHING TO END YOUR LIFE?: NO

## 2024-11-05 ASSESSMENT — PAIN DESCRIPTION - DESCRIPTORS: DESCRIPTORS: SORE

## 2024-11-05 NOTE — ANESTHESIA PREPROCEDURE EVALUATION
"Patient: Sara Benton \"Laure\"    Procedure Information       Date/Time: 11/05/24 1245    Procedure: Hip arthroscopy, labral repair, rim trim, osteoplasty, capsular plication ([place] bed) (Left: Hip)    Location: [facility] / [facility]    Surgeons: Kobi Chávez MD          Past Medical History:   Diagnosis Date    Anxiety     Depression     Easy bruising     History of recurrent ear infection     Other abnormalities of gait and mobility 10/21/2015    Poor balance    Pain in right wrist 10/18/2019    Right wrist pain    Pain in unspecified ankle and joints of unspecified foot 09/29/2015    Ankle pain    Peroneal tendinitis, left leg 11/18/2015    Peroneal tendinitis of left lower extremity    Skin disorder     eczema    Urinary tract infection      Past Surgical History:   Procedure Laterality Date    EXTERNAL EAR SURGERY  2009    crayon removed from ear    WISDOM TOOTH EXTRACTION Bilateral 2021       Relevant Problems   Anesthesia (within normal limits)      Neuro   (+) Anxiety   (+) Depression       Clinical information reviewed:    Allergies  Meds               NPO Detail:  No data recorded     Physical Exam    Airway  Mallampati: II  TM distance: >3 FB  Neck ROM: full     Cardiovascular   Comments: deferred   Dental   Comments: No loose teeth   Pulmonary   Comments: deferred   Abdominal     Comments: deferred           Anesthesia Plan    History of general anesthesia?: yes  History of complications of general anesthesia?: no    ASA 2     general and regional     The patient is not a current smoker.  Patient was not previously instructed to abstain from smoking on day of procedure.  Patient did not smoke on day of procedure.  Education provided regarding risk of obstructive sleep apnea.  intravenous induction   Postoperative administration of opioids is intended.  Anesthetic plan and risks discussed with patient.  Use of blood products discussed with patient who consented to blood products.  "   Plan discussed with CRNA and CAA.

## 2024-11-05 NOTE — DISCHARGE INSTRUCTIONS
Hip Arthroscopy Postoperative Instructions       Diet  Begin with clear liquids and light foods (jello, soup, etc)  Progress to your normal diet if you are not nauseated    Wound Care  Maintain your operative dressing, loosen bandage if swelling occurs  It is normal to have some bleeding or oozing from the surgical sites due to fluid irrigation during the surgery.  Please change the dressing as needed.  Removal surgical dressing on the third post-operative day. If minimal drainage is present, apply bandaids over incisions and change daily.  Do not apply bacitracin or other ointments to the incisions.  You can remove ALVAREZ hose (long white socks) on the third post operative day  To avoid infection, keep incisions clean and dry.  You can shower 2 days post op.  MUST KEEP THE INCISIONS DRY.  NO immersion of the leg into a bath/pool etc.    Ice Therapy  Begin immediately after surgery  Use ice machine continuously or ice packs every 2 hours for 20 minutes daily.  Do not place the wrap or ice packs directly onto skin.     Activity  Elevate the operative leg to chest level whenever possible to decrease swelling  Do not place pillows under the knee, but rather place a pillow under the foot/ankle if needed  Use Crutches for ambulation.  Weight bearing will be determined by the procedure  Avoid long periods of sitting without the leg elevated or long distance travel for 2 weeks  No driving until discussed at your first post-operative appointment  May return to sedentary work or school once you have discontinued narcotic pain medication    Brace  Your brace should be worn at all times but can be removed for hygiene and physical therapy     Exercise  Do ankle pumps continuously throughout the day to reduce the possibility of a blood clot in your calf  Formal physical therapy will begin post-operative day #1      Medications  Pain medication is injected in the wound and hip during surgery.  This will wear off within 8-12 hours.    You may have received a nerve block prior to surgery. If so, this will wear off within 24-36 hours.  Most patients require narcotic pain medication for a short period of time after surgery.  Common side effects include nausea, drowsiness and constipation.  To decrease side effects take these medications with food. If constipation occurs, you can utilize over the counter Colace or Miralax.  If you are having problems with nausea and vomiting, contact the office to discuss.  Do not drive a car or operate machinery while taking narcotic pain medication.  The following medications are enclosed to use post-operatively  Percocet (Oxycodone with Acetaminophen) for pain control  Indocin (Indomethacin) for heterotopic bone prophylaxis.  **MAKE SURE THIS MEDICATION IS FILLED AND TAKEN AS DIRECTED**  Baby aspirin twice a day to help reduce the risk of a blood clot  Zofran (Ondansetron) as needed for nausea  Robaxin (Methocarbamol) as needed for spasms should they occur  Naproxen to take as needed for pain after you complete the Indocin    Contact information  Our office phone is [phone].  Contact the office with any of the following  Painful swelling or numbness  Unrelenting pain  Fever over 101° or chills  Redness around incision  Continuous drainage or bleeding from the incision. A small amount is to be expected)  Color change in the leg  Trouble breathing  Excessive nausea or vomiting  If you have an emergency after hours on the weekend, please call [phone] to reach the answering service who will contact Dr. Chávez  If you have an emergency that requires immediate attention, proceed to the nearest emergency room or call [phone].    Follow up  Your first post operative appointment should be scheduled around 14 days after surgery. Contact the office at [phone] if this has not yet been set up. .

## 2024-11-05 NOTE — ANESTHESIA PROCEDURE NOTES
Airway  Date/Time: 11/5/2024 1:29 PM  Urgency: elective    Airway not difficult    Staffing  Performed: LUDIVINA   Authorized by: Onesimo Vasquez DO    Performed by: LUDIVINA Griffin  Patient location during procedure: OR    Indications and Patient Condition  Indications for airway management: anesthesia and airway protection  Spontaneous Ventilation: absent  Sedation level: deep  Preoxygenated: yes  Patient position: sniffing  MILS maintained throughout  Mask difficulty assessment: 1 - vent by mask  Planned trial extubation    Final Airway Details  Final airway type: endotracheal airway      Successful airway: ETT  Cuffed: yes   Successful intubation technique: direct laryngoscopy  Facilitating devices/methods: intubating stylet  Blade: Sara  Blade size: #3  ETT size (mm): 7.0  Cormack-Lehane Classification: grade I - full view of glottis  Placement verified by: chest auscultation, capnometry and palpation of cuff   Measured from: lips  ETT to lips (cm): 22  Number of attempts at approach: 1

## 2024-11-05 NOTE — H&P
"History Of Present Illness  Laure Benton is a 24 y.o. female presenting with hip pain.  Pain began in March 2023 when she was away on spring break ran after her brother on a gravel surface and felt a \"pop\" over the anterior aspect of her knee.  She previously been seen by Dr. Jennings for left hip x-rays and MRI were performed.  She had an additional L4-L5, L5-L1 disc bulge and sciatica around the same period of time treated by Dr. Maria Luisa guardado conservatively through formal physical therapy.  She under went a injection into the left hip under ultrasound guidance which gave her about 45% relief for 6 weeks.  Reports a stabbing sensation over the anterior posterior hip worsens with cutting change of directions and prolonged sitting.  She has utilized naproxen in the past.  She denies numbness or tingling in the left lower extremity.  Presents for treatment recommendations..     Past Medical History  She has a past medical history of Anxiety, Depression, Easy bruising, History of recurrent ear infection, Other abnormalities of gait and mobility (10/21/2015), Pain in right wrist (10/18/2019), Pain in unspecified ankle and joints of unspecified foot (09/29/2015), Peroneal tendinitis, left leg (11/18/2015), Skin disorder, and Urinary tract infection.    Surgical History  She has a past surgical history that includes [place] tooth extraction (Bilateral, 2021) and External ear surgery (2009).     Social History  She reports that she has never smoked. She has never used smokeless tobacco. She reports current alcohol use of about 4.0 standard drinks of alcohol per week. She reports current drug use. Frequency: 5.00 times per week. Drug: Marijuana.    Family History  Family History   Problem Relation Name Age of Onset    Hypertension Mother      Depression Mother      Other (anxiety) Mother      Sleep apnea Father      ADD / ADHD Sister Yamilet     Other (Other) Brother Samy     ADD / ADHD Brother Samy     Alcohol abuse Maternal " Grandmother          and drugs    Bipolar disorder Maternal Grandmother      Depression Maternal Grandfather      Other (Other) Maternal Grandfather      Alcohol abuse Maternal Grandfather      Other (Other) Paternal Grandmother      Hypertension Paternal Grandmother      Diabetes Paternal Grandmother      Glaucoma Paternal Grandmother          Allergies  Pollen extracts    Review of Systems  Negative unless stated in HPI  Physical Exam   There is not  pain with a resisted situp.    There is not abdominal distention or tenderness     The patient's range of motion reveals that they have 90° of hip flexion on the affected side.   ER 70 degrees.   IR 20 degrees  Hip extension to 10°   Abduction to 45°        The patient is 5 out of 5 strength with resisted hip AB, adduction, hamstring and quadriceps testing.     No pain over the hip flexor, ASIS.  No pain over the proximal hamstring, piriformis        Pain Provacation testing:     Positive impingement sign,with a painful arc from 12 to 3:00.  Negative Psoas impingement/Renu test  Negative instability, Log roll.  Positive subspine impingement test, which is pain with straight hip flexion.     Negative straight leg raise.  Negative circumduction clunk.        Peritrochanteric space examination:  Tenderness over the [place]-trochanteric space - Yes  pain over the posterior trochanter - Yes  Last Recorded Vitals  There were no vitals taken for this visit.    Relevant Results    X-rays reviewed reveal no gross fracture or dislocation. and evidence of femoral acetabular impingement with an alpha angle of 65.7.  Acetabular index of 2.2  without acetabular retroversion.  Lateral center edge of 29.9.     MRI reviewed reveals tearing of the acetabular labrum  Scheduled medications    Continuous medications    PRN medications    No results found for this or any previous visit (from the past 24 hours).    Assessment/Plan   Assessment & Plan  Femoroacetabular impingement of left  hip    Tear of left acetabular labrum    Loose body in left hip      The patient I discussed her clinical presentation and physical exam findings consistent with left femoral acetabular impingement.  We discussed her continued conservative versus surgical treatment options.  She has exhausted all forms of conservative measures with oral anti-inflammatory medication, activity modification and formal physical therapy which has inadequately resolved her symptoms.  She desires to remain active.  Therefore we agreed upon a left hip arthroscopy.  Risk and benefits of the procedure were discussed at length with the patient and she wishes to pursue.       H&P information gathered from her office visit on 9/4/2024

## 2024-11-05 NOTE — PERIOPERATIVE NURSING NOTE
"Pt alert, stated pain \"came down a notch\" POC and post op pain control, expectation reviewed.  Pt denies PONV, tolerating PO intake.  "

## 2024-11-05 NOTE — ANESTHESIA PROCEDURE NOTES
Peripheral Block    Patient location during procedure: pre-op  Start time: 11/5/2024 1:01 PM  End time: 11/5/2024 1:03 PM  Reason for block: at surgeon's request and post-op pain management  Staffing  Performed: attending   Authorized by: Onesimo Vasquez DO    Performed by: Onesimo Vasquez DO  Preanesthetic Checklist  Completed: patient identified, IV checked, site marked, risks and benefits discussed, surgical consent, monitors and equipment checked, pre-op evaluation and timeout performed   Timeout performed at: 11/5/2024 12:53 PM  Peripheral Block  Patient position: laying flat  Prep: ChloraPrep  Patient monitoring: heart rate, cardiac monitor and continuous pulse ox  Block type: other  Laterality: left  Injection technique: single-shot  Guidance: ultrasound guided  Local infiltration: ropivacaine  Infiltration strength: 0.5 %  Dose: 20 mL  Needle  Needle gauge: 22 G  Needle length: 10 cm  Needle localization: ultrasound guidance  Assessment  Injection assessment: negative aspiration for heme, no paresthesia on injection and incremental injection  Paresthesia pain: none  Heart rate change: no  Slow fractionated injection: yes

## 2024-11-05 NOTE — ANESTHESIA POSTPROCEDURE EVALUATION
"Patient: Sara Betnon \"Krista"    Procedure Summary       Date: 11/05/24 Room / Location: [facility] / [facility]    Anesthesia Start: 1318 Anesthesia Stop: 1542    Procedure: Hip arthroscopy, labral repair, rim trim, osteoplasty, capsular plication ([place] bed) (Left: Hip) Diagnosis:       Tear of left acetabular labrum, initial encounter      Femoroacetabular impingement of left hip      Loose body in left hip      (Tear of left acetabular labrum, initial encounter [S73.192A])      (Femoroacetabular impingement of left hip [M25.852])      (Loose body in left hip [M24.052])    Surgeons: Kobi Chávez MD Responsible Provider: Johnnie Martinez MD    Anesthesia Type: general, regional ASA Status: 2            Anesthesia Type: general, regional    Vitals Value Taken Time   /96 11/05/24 1554   Temp 36.6 11/05/24 1554   Pulse 61 11/05/24 1554   Resp 12 11/05/24 1554   SpO2 100% 11/05/24 1554       Anesthesia Post Evaluation    Patient location during evaluation: PACU  Patient participation: complete - patient participated  Level of consciousness: awake and alert  Pain score: 5  Pain management: adequate  Multimodal analgesia pain management approach  Airway patency: patent  Two or more strategies used to mitigate risk of obstructive sleep apnea  Cardiovascular status: acceptable and blood pressure returned to baseline  Respiratory status: acceptable  Hydration status: acceptable  Postoperative Nausea and Vomiting: none        There were no known notable events for this encounter.    "

## 2024-11-06 ENCOUNTER — TELEPHONE (OUTPATIENT)
Dept: ORTHOPEDIC SURGERY | Facility: HOSPITAL | Age: 24
End: 2024-11-06
Payer: COMMERCIAL

## 2024-11-06 ENCOUNTER — CLINICAL SUPPORT (OUTPATIENT)
Dept: PHYSICAL THERAPY | Facility: CLINIC | Age: 24
End: 2024-11-06
Payer: COMMERCIAL

## 2024-11-06 DIAGNOSIS — M25.852 FEMOROACETABULAR IMPINGEMENT OF LEFT HIP: Primary | ICD-10-CM

## 2024-11-06 DIAGNOSIS — M54.16 LUMBAR RADICULITIS: ICD-10-CM

## 2024-11-06 PROCEDURE — 97110 THERAPEUTIC EXERCISES: CPT | Mod: GP

## 2024-11-06 RX ORDER — PROMETHAZINE HYDROCHLORIDE 25 MG/1
25 TABLET ORAL EVERY 6 HOURS PRN
Qty: 28 TABLET | Refills: 0 | Status: SHIPPED | OUTPATIENT
Start: 2024-11-06 | End: 2024-11-13

## 2024-11-06 NOTE — PROGRESS NOTES
Physical Therapy  Physical Therapy Treatment Note    Patient Name: Sara Benton  MRN: [id number]  Today's Date: 9/11/24  Time Calculation  Start Time: 1515  Stop Time: 1600  Time Calculation (min): 45 min  PT Therapeutic Procedures Time Entry  Therapeutic Exercise Time Entry: 39        Insurance:  Visit number: 4 of 60  Authorization info: No auth   Insurance Type: Payor: [employer] / Plan: [employer] HE[employer] PLAN / Product Type: *No Product type* /   Current Problem  1. Femoroacetabular impingement of left hip        2. Lumbar radiculitis  Referral to Physical Therapy        General  Reason for Referral: L hip scope  Referred By: Dr. Chávez  General Comment: DOS: 11/5/24  Precautions     Subjective:     Pt returns to the clinic for an evaluation one day post op L hip arthroscopy with labral repair and femoral osteoplasty. Pt is doing well so far with minimal pain . She reports some numbness on the lateral aspect of her L thigh but also does not think the nerve block has completely worn off. Pt is aware of her 50% WB precautions. She understands the S/s infection and DVT. She has been compliant with brace and CPM machine usage     Pain     Objective:   Gait: Bilateral axillary crutch gait pattern with swing through 50% weight bearing gait pattern. Decreased hip extension and guarded posture with flexed hip in standing.  Transfers: increased time to perform and guarded with assist to lift affected surgical limb    Incisions:  Unable to view ports due to surgical bandage in place. Educated pt to remove at day 3 and check for s/s of infection.    L hip PROM (AROM not tested at eval secondary to recent surgery)  Flexion: Able to achieve 60* with no increase in discomfort  Extension: Pt kept at 0* extension due to recent surgery   IR: Pt able to achieve 10* with no increase in discomfort (stayed within precautions)  ER: Pt able to achieve 10* with no increase in discomfort (stayed within precautions)  ABD: Pt able to achieve 20*  with no increase in discomfort (stayed within precautions)     R hip PROM  Flexion: 0-130* with no increase in discomfort   IR: 0-45* with no increase in discomfort   ER: 0-45* with no increase in discomfort   ABD: 0-45* with no increase in discomfort     MMT (R/L)  NT secondary to recent surgery    Special Tests  Not tested secondary to recent surgery    Sensation intact and equal bilaterally    Treatments:     THERE EX  Access Code: [email or web address]    Pt education on proper brace wearing and precautions   Brace and crutch fitting   Exercises  - Bent Knee Fallouts 3 sets - 10 reps  - Supine Gluteal Sets  3 sets - 10 reps - 5 sec hold  - Supine Posterior Pelvic Tilt  -3 sets - 10-15 reps  - Supine Quadricep Sets  -3 sets - 10 reps - 5 sec  hold  - Supine Hip Adduction Isometric with Ball  - 3 sets - 10 reps - 5 sec hold  - Hooklying Isometric Hip Abduction with Belt   3 sets - 10 reps - 5 sec hold      MANUAL      Assessment:      The pt presents post-op for intra-articular labral repair and hip osteochondroplasty. They demonstrate deficits including limited weight bearing, an abnormal gait pattern, slightly elevated pain levels, decreased ROM, decreased strength, impaired neruomuscular control, and limited positional tolerances. Impairments result in difficulty with all ADL functions, patient requiring assist for ADLs, difficulty with ambulation and standing tolerance, unable to return to work secondary dysfunction, and pain with functional mobility. Skilled therapy is recommended to return to work, decrease pain, improve functional mobility, and return to prior level ADL function. Prognosis is good secondary to current presentation and client understanding. The client demonstrates a good understanding of their current plan of care, rehab expectations, and prognosis       Plan: Continue to work on hip flexibility and strengthening      Goals:  Active       PT Problem       PT Goals       Start:  08/27/24       1. Pt will  increase LEFS with 65/80 or better in order to demo an increase in L knee function  2. Pt will demo 5/5 or all hip/knee MMT in order to demo an increase in LE strength   3. Pt will demo compliance and independence with HEP   4.  Pt will be able to jog for 30 mins without any increase in hip pain  5. Pt will be able to perform full fitness routine without any increase in L hip pain

## 2024-11-06 NOTE — TELEPHONE ENCOUNTER
Patient called back and I missed her and left another message.  I then received a voicemail from her stating that she had already scheduled an appointment with Tim for 11/20/24. CT  
Applied

## 2024-11-06 NOTE — OP NOTE
"Hip arthroscopy, labral repair, rim trim, osteoplasty, capsular plication ([place] bed) (L) Operative Note     Date: 2024  OR Location: [facility]    Name: Sara Benton \"Krista", : 2000, Age: 24 y.o., MRN: [id number], Sex: female    PREOPERATIVE DIAGNOSIS:   1. left hip mixed type femoral acetabular impingement.   2. Acetabular labral tear.   3. Intra-articular loose bodies, one of which required separate   cannula for its removal.   4. Mild hip instability noted on exam under anesthesia.       POSTOPERATIVE DIAGNOSIS:   1. left hip mixed type femoral acetabular impingement.   2. Acetabular labral tear.   3. Intra-articular loose bodies, one of which required separate   cannula for its removal.   4. Mild hip instability noted on exam under anesthesia.   5. Calcified labrum with decreased labral height at the 2-3 o'clock position  6. Hypotrophic proximal hip capsule  7. Impingement cyst on the posterior lateral femoral head location      OPERATION/PROCEDURE:   1. left hip arthroscopy with arthroscopic rim trim subspinous   decompression as a separate and identifiable procedure for pincer and   subspinous impingement.   2. Acetabular labral repair, 3-anchor looped configuration for a tear   extending from the 12 to 3 o'clock position.   3. Intra-articular loose body removal.   4. Femoral osteochondroplasty for CAM lesion.   5. Capsular plication.       SURGEON:   Kobi Chávez MD.       ASSISTANT(S):         TRACTION TIME:   Less than 1 hour.       INDICATION FOR PROCEDURE:   Pleasant patient presented to my office with   persistent left-sided hip pain that had failed conservative   management.  Advanced imaging had revealed a labral tear in the   setting of mixed impingement.  Risks and benefits of surgery have   been discussed with the patient including, but not limited to,   bleeding, infection, damage to nerves or blood vessels, need for   further procedures, risks of anesthesia, blood " clots, progression of   osteoarthritis, incomplete pain relief, heterotopic ossification,   pudendal nerve palsy, lateral femoral cutaneous nerve palsy,   avascular necrosis requiring hip replacement.  The patient understood   these risks and wished to proceed with the operative intervention.       PROCEDURE AND FINDINGS:   The patient was identified in the preoperative holding area.   Operative extremity was marked with an indelible marker.  Informed   consent was reviewed.  Patient was taken to the operating room where a   time-out was performed verifying correct site, side, procedure, and   our special equipment.   They were placed supine on the operating room   table.  All bony prominences were well padded.  Patient was then prepped   and draped in usual sterile fashion after anesthesia induced was   without difficulty.  Once the patient was prepped and draped, the hip   was distracted via postless technique.  Standard anterolateral   arthroscopy portal was created.  A modified mid anterior portal   created.  A capsulotomy was performed.  Combination of a shaver and   radiofrequency device used to clean off the superior acetabular rim   identifying the patient's pincer and subspinous impingement, this was   taken down with a bur in the standard fashion as a separate and   identifiable procedure.  We then placed 3 anchors; 1 at 12, 1 at 1, 1   at 3 o'clock; these were shuttled around the labrum, tied down with   alternating half hitches in a loop configuration.  We noted excellent   fixation of labrum.  A few intra-articular loose bodies were removed   Arthroscopically as they were encountered at the time of the operation   and a separate cannula was placed for their removal in order to facilitate  their removal due to size.  Head was reduced.  We noted excellent resolution   of the patient's suction seal.  We identified and protected the   lateral retinacular vessels throughout the remainder of the case.  An    osteoplasty was then performed from the medial to lateral synovial   fold and proximally and distally to the extent of lesion.  Dynamic   examination revealed no residual impingement.  An x-ray showed good   sphericity on multiple views.  Given the patients laxity noted with distraction   on their exam under anesthesia we then performed a capsular plication   of the interportal capsulotomy with multiple sutures, tying these   down with alternating half hitches and clipping them with the knot.   We drained the hip, withdrew the arthroscope, closed the portals with   interrupted sutures, applied a sterile bandage.  The patient was   awoken from anesthesia without complication, transported to PACU in   stable condition.  Postoperative course will be standard hip   arthroscopy protocol.           Kobi Chávez MD

## 2024-11-06 NOTE — TELEPHONE ENCOUNTER
Called patient per the request of Tim Hare to get her scheduled for her first POV approximately 2 weeks from her surgery yesterday, 11/05/24. Patient didn't answer so I left her a voicemail with some possible times on 11/18/24 with Tim Hare. I left my  call back number of [phone] so that I could help her get scheduled. CT

## 2024-11-08 ENCOUNTER — TREATMENT (OUTPATIENT)
Dept: PHYSICAL THERAPY | Facility: CLINIC | Age: 24
End: 2024-11-08
Payer: COMMERCIAL

## 2024-11-08 ENCOUNTER — APPOINTMENT (OUTPATIENT)
Dept: PHYSICAL THERAPY | Facility: HOSPITAL | Age: 24
End: 2024-11-08
Payer: COMMERCIAL

## 2024-11-08 DIAGNOSIS — M25.852 FEMOROACETABULAR IMPINGEMENT OF LEFT HIP: ICD-10-CM

## 2024-11-08 PROCEDURE — 97110 THERAPEUTIC EXERCISES: CPT | Mod: GP

## 2024-11-08 PROCEDURE — 97140 MANUAL THERAPY 1/> REGIONS: CPT | Mod: GP

## 2024-11-08 NOTE — PROGRESS NOTES
"Physical Therapy  Physical Therapy Treatment Note    Patient Name: Sara Benton  MRN: [id number]  Today's Date: 11/8/2024  Time Calculation  Start Time: 1200  Stop Time: 1245  Time Calculation (min): 45 min  PT Therapeutic Procedures Time Entry  Manual Therapy Time Entry: 20  Therapeutic Exercise Time Entry: 25        Insurance:  Visit number: 5 of 60  Authorization info: no auth  Insurance Type: Payor: [employer] / Plan: [employer] HEALTH PLAN / Product Type: *No Product type* /   Current Problem  1. Femoroacetabular impingement of left hip  Follow Up In Physical Therapy        General  Reason for Referral: L hip scope  Referred By: Dr. Chávez  General Comment: DOS: 11/5/24    Precautions  Salata Post op Protocol     Subjective:   Subjective   Patient reports she is doing well. Has been compliant with her CPM. Completed 6 hours on CPM at 30 deg yesterday. HEP is going well. She has been experiencing some cramping with a few of the exercises. Pain meds and muscle relaxers PRN; helpful.     Pain  3/10 with meds    Objective:   Objective   Treatments:   THERE EX  25  Upright bike x 10 min with brace on (instructed pt to self initiate bike next visit)  Quad set 5\" x 20  Gluteal set 5\" x 20  Log rolling x 20  PPT x 20  Beginner bridge x 20    MANUAL 20   PROM Left hip within post op precautions  STM to Left adductors, quad (other areas restricted due to bandage)    NMRE    THERE ACT    Assessment:  POD #3 LEFT intra-articular labral repair and hip osteochondroplasty. Doing well with no post op concerns. Pt plans to remove bandage later today.      Plan: Progress per protocol     Goals:  Active       PT Problem       PT Goals       Start:  08/27/24       1. Pt will increase LEFS with 65/80 or better in order to demo an increase in L knee function  2. Pt will demo 5/5 or all hip/knee MMT in order to demo an increase in LE strength   3. Pt will demo compliance and independence with HEP   4.  Pt will be able to jog for 30 mins " without any increase in hip pain  5. Pt will be able to perform full fitness routine without any increase in L hip pain

## 2024-11-11 ENCOUNTER — TREATMENT (OUTPATIENT)
Dept: PHYSICAL THERAPY | Facility: CLINIC | Age: 24
End: 2024-11-11
Payer: COMMERCIAL

## 2024-11-11 DIAGNOSIS — M25.852 FEMOROACETABULAR IMPINGEMENT OF LEFT HIP: ICD-10-CM

## 2024-11-11 PROCEDURE — 97140 MANUAL THERAPY 1/> REGIONS: CPT | Mod: GP

## 2024-11-11 PROCEDURE — 97110 THERAPEUTIC EXERCISES: CPT | Mod: GP

## 2024-11-11 NOTE — PROGRESS NOTES
Physical Therapy  Physical Therapy Treatment Note    Patient Name: Sara Benton  MRN: [id number]  Today's Date: 11/11/2024  Time Calculation  Start Time: 1438  Stop Time: 1530  Time Calculation (min): 52 min  PT Therapeutic Procedures Time Entry  Manual Therapy Time Entry: 25  Therapeutic Exercise Time Entry: 20        Insurance:  Visit number: 6 of 60  Authorization info: no auth  Insurance Type: Payor: [employer] / Plan: [employer] HEALTH PLAN / Product Type: *No Product type* /   Current Problem  1. Femoroacetabular impingement of left hip  Follow Up In Physical Therapy        General  Reason for Referral: L hip scope  Referred By: Dr. Chávez  General Comment: DOS: 11/5/24    Precautions  Saira Post op Protocol     Subjective:   Subjective   Patient is at 46 degrees on her CPM machine. She took bandages off last Friday and currently has bandages over her incisions. Pt has been feeling good overall with minimal pain.     Pain  2/10 with meds    Objective:   Objective   Treatments:   THERE EX  25  Upright bike x 10 min with brace on (instructed pt to self initiate bike next visit)  Supine BKFO 1 x 20   Supine adduction squeeze 2 x 10   Supine belted abduction isometric 2 x 10   Belted bridge 2 x 10   Quadruped rocking 1 x 20     MANUAL 20   PROM Left hip within post op precautions  STM to Left adductors, quad (other areas restricted due to bandage)    NMRE    THERE ACT    Assessment:    Pt tolerated session well today. Pt was able to continue to progress all exercises today per protocol. Pt's ROM continues to improve without any pinching in end range. Pt continues to progress towards goals established in the POC and should continue with skilled therapy.       Plan: Progress per protocol     Goals:  Active       PT Problem       PT Goals       Start:  08/27/24       1. Pt will increase LEFS with 65/80 or better in order to demo an increase in L knee function  2. Pt will demo 5/5 or all hip/knee MMT in order to demo an  increase in LE strength   3. Pt will demo compliance and independence with HEP   4.  Pt will be able to jog for 30 mins without any increase in hip pain  5. Pt will be able to perform full fitness routine without any increase in L hip pain

## 2024-11-12 DIAGNOSIS — Z30.431 IUD CHECK UP: Primary | ICD-10-CM

## 2024-11-13 ENCOUNTER — TREATMENT (OUTPATIENT)
Dept: PHYSICAL THERAPY | Facility: CLINIC | Age: 24
End: 2024-11-13
Payer: COMMERCIAL

## 2024-11-13 DIAGNOSIS — M25.852 FEMOROACETABULAR IMPINGEMENT OF LEFT HIP: ICD-10-CM

## 2024-11-13 PROCEDURE — 97140 MANUAL THERAPY 1/> REGIONS: CPT | Mod: GP

## 2024-11-13 PROCEDURE — 97110 THERAPEUTIC EXERCISES: CPT | Mod: GP

## 2024-11-13 NOTE — PROGRESS NOTES
Physical Therapy  Physical Therapy Treatment Note    Patient Name: Sara Benton  MRN: [id number]  Today's Date: 11/13/2024  Time Calculation  Start Time: 1515  Stop Time: 1600  Time Calculation (min): 45 min  PT Therapeutic Procedures Time Entry  Manual Therapy Time Entry: 23  Therapeutic Exercise Time Entry: 18        Insurance:  Visit number: 7 of 60  Authorization info: no auth  Insurance Type: Payor: [employer] / Plan: [employer] HEALTH PLAN / Product Type: *No Product type* /   Current Problem  1. Femoroacetabular impingement of left hip  Follow Up In Physical Therapy        General  Reason for Referral: L hip scope  Referred By: Dr. Chávez  General Comment: DOS: 11/5/24    Precautions  Salata Post op Protocol     Subjective:     Pt has continued to use CPM machine each day and has been keeping up with her HEP. Pt's pain has been minimal with only some soreness after her HEP.     Pain  2/10 with meds    Objective:   Objective   Treatments:   THERE EX  25  Upright bike x 10 min with brace on (instructed pt to self initiate bike next visit)  Supine BKFO 1 x 20   Supine adduction squeeze 2 x 10   Supine adduction bridge 2 x 10   Prone hip ER/IR (small ROM) 2 x 10   Quadruped rocking 1 x 20     MANUAL 20   PROM Left hip within post op precautions  STM to Left adductors, quad (other areas restricted due to bandage)    NMRE    THERE ACT    Assessment:    Pt tolerated session well today. Pt's PROM flexion continues to improve with no pain in end range flexion. Pt was able to progress strengthening with proper form. Pt continues to progress towards goals established in the POC and should continue with skilled therapy.         Plan: Progress per protocol     Goals:  Active       PT Problem       PT Goals       Start:  08/27/24       1. Pt will increase LEFS with 65/80 or better in order to demo an increase in L knee function  2. Pt will demo 5/5 or all hip/knee MMT in order to demo an increase in LE strength   3. Pt will demo  compliance and independence with HEP   4.  Pt will be able to jog for 30 mins without any increase in hip pain  5. Pt will be able to perform full fitness routine without any increase in L hip pain

## 2024-11-18 ENCOUNTER — APPOINTMENT (OUTPATIENT)
Dept: PEDIATRICS | Facility: CLINIC | Age: 24
End: 2024-11-18
Payer: COMMERCIAL

## 2024-11-18 VITALS
WEIGHT: 141 LBS | HEART RATE: 84 BPM | BODY MASS INDEX: 23.49 KG/M2 | HEIGHT: 65 IN | SYSTOLIC BLOOD PRESSURE: 131 MMHG | DIASTOLIC BLOOD PRESSURE: 76 MMHG

## 2024-11-18 DIAGNOSIS — Z00.00 WELLNESS EXAMINATION: Primary | ICD-10-CM

## 2024-11-18 DIAGNOSIS — F41.9 ANXIETY DISORDER, UNSPECIFIED: ICD-10-CM

## 2024-11-18 PROCEDURE — 90656 IIV3 VACC NO PRSV 0.5 ML IM: CPT | Performed by: PEDIATRICS

## 2024-11-18 PROCEDURE — 3008F BODY MASS INDEX DOCD: CPT | Performed by: PEDIATRICS

## 2024-11-18 PROCEDURE — 90471 IMMUNIZATION ADMIN: CPT | Performed by: PEDIATRICS

## 2024-11-18 PROCEDURE — 1036F TOBACCO NON-USER: CPT | Performed by: PEDIATRICS

## 2024-11-18 PROCEDURE — 99395 PREV VISIT EST AGE 18-39: CPT | Performed by: PEDIATRICS

## 2024-11-18 RX ORDER — FLUOXETINE HYDROCHLORIDE 20 MG/1
40 CAPSULE ORAL DAILY
Qty: 180 CAPSULE | Refills: 3 | Status: SHIPPED | OUTPATIENT
Start: 2024-11-18 | End: 2025-11-18

## 2024-11-18 NOTE — PROGRESS NOTES
"Geovanni Benton \"Laure\" is a 24 y.o. female who presents for Well Child (24 yr [facility] mom in waiting room).  HPI      Concerns:     Here with mom in the waiting room because she had surgery     Sleep: well rested and waking up well in the morning   Diet: offering a variety of food groups  [place]:  soft and regular  Dental:  brushing twice a day and seeing dentist  School:   at  completely , mostly at [employer] and going to [employer] next year    Activities:    Menstruation: has an iud- getting an ultrasound soon   Drugs/Alcohol/Tobacco/Vaping: discussed and now not much  Sexuality/Puberty: discussed  Safety: discussed   Depression screen done and denies - doing well with the prozac    ROS: negative for general,  Eyes, ENT, cardiovascular, GI. , Ortho, Derm, Psych, Lymph unless noted above    Objective   /76   Pulse 84   Ht 1.638 m (5' 4.5\")   Wt 64 kg (141 lb) Comment: 141  LMP 10/07/2024 (Exact Date) Comment: neg hcg 11/5/24  BMI 23.83 kg/m²   Percentiles: Facility age limit for growth %nixon is 20 years.  Facility age limit for growth %nixon is 20 years.        Physical Exam  General: Well-developed, well-nourished, alert and oriented, no acute distress  Eyes: Normal sclera, VIOLETA, EOMI. Red reflex intact, light reflex symmetric.   ENT: Moist mucous membranes, normal throat, no nasal discharge. TMs are normal.  Cardiac:  Normal S1/S2, regular rhythm. Capillary refill less than 2 seconds. No clinically significant murmurs.    Pulmonary: Clear to auscultation bilaterally, no work of breathing.  GI: Soft nontender nondistended abdomen, no HSM, no masses.    Skin: No specific or unusual rashes  Neuro: Symmetric face, no ataxia, grossly normal strength and normal reflexes.  Lymph and Neck: No lymphadenopathy, no visible thyroid swelling.  Musculoskeletal:   Full  range of motion, normal strength and tone, no significant scoliosis,  no joint swelling or bone tenderness  Psych:  normal mood and affect  :  " normal female  Sunday:     No visits with results within 10 Day(s) from this visit.   Latest known visit with results is:   Admission on 11/05/2024, Discharged on 11/05/2024   Component Date Value Ref Range Status    HCG, Urine 11/05/2024 NEGATIVE  NEGATIVE Final       Depression screening score:       Assessment/Plan   Diagnoses and all orders for this visit:  Wellness examination  Anxiety disorder, unspecified  -     FLUoxetine (PROzac) 20 mg capsule; Take 2 capsules (40 mg) by mouth once daily.  Other orders  -     Flu vaccine, trivalent, preservative free, age 6 months and greater (Fluarix/Fluzone/Flulaval)      Patient Instructions   We talked about transitioning to an adult doctor  The Flu shot was given today  Continue with the prozac 40mg              Xiomara Walsh MD

## 2024-11-19 ENCOUNTER — TREATMENT (OUTPATIENT)
Dept: PHYSICAL THERAPY | Facility: CLINIC | Age: 24
End: 2024-11-19
Payer: COMMERCIAL

## 2024-11-19 DIAGNOSIS — M25.852 FEMOROACETABULAR IMPINGEMENT OF LEFT HIP: ICD-10-CM

## 2024-11-19 PROCEDURE — 97140 MANUAL THERAPY 1/> REGIONS: CPT | Mod: GP

## 2024-11-19 PROCEDURE — 97110 THERAPEUTIC EXERCISES: CPT | Mod: GP

## 2024-11-19 NOTE — PROGRESS NOTES
Physical Therapy  Physical Therapy Treatment Note    Patient Name: Sara Benton  MRN: [id number]  Today's Date: 11/19/2024  Time Calculation  Start Time: 1430  Stop Time: 1515  Time Calculation (min): 45 min  PT Therapeutic Procedures Time Entry  Manual Therapy Time Entry: 18  Therapeutic Exercise Time Entry: 23        Insurance:  Visit number: 8 of 60  Authorization info: no auth  Insurance Type: Payor: [employer] / Plan: [employer] HEALTH PLAN / Product Type: *No Product type* /   Current Problem  1. Femoroacetabular impingement of left hip  Follow Up In Physical Therapy        General  Reason for Referral: L hip scope  Referred By: Dr. Chávez  General Comment: DOS: 11/5/24    Precautions  Saira Post op Protocol     Subjective:     Pt continues to have minimal pain and will be seeing the surgeon tomorrow.     Pain  2/10 with meds    Objective:   Objective   Treatments:     [id number]     THERE EX    Upright bike x 10 min with brace on (instructed pt to self initiate bike next visit)  Supine BKFO 1 x 20   Supine adduction squeeze 2 x 10   Supine adduction bridge 2 x 10   Supine isometric clamshell 2x 10   Quadruped rocking 1 x 20     MANUAL   PROM Left hip within post op precautions  STM to Left adductors, quad (other areas restricted due to bandage)    NMRE    THERE ACT    Assessment:    Pt tolerated session well today. Pt's ROM continues to improve with no pinching in end range. Pt was able to begin isometrics with band today without any increase in hip discomfort. Pt continues to progress towards goals established in the POC and should continue with skilled therapy.           Plan: Progress per protocol     Goals:  Active       PT Problem       PT Goals       Start:  08/27/24       1. Pt will increase LEFS with 65/80 or better in order to demo an increase in L knee function  2. Pt will demo 5/5 or all hip/knee MMT in order to demo an increase in LE strength   3. Pt will demo compliance and independence with HEP   4.  Pt  will be able to jog for 30 mins without any increase in hip pain  5. Pt will be able to perform full fitness routine without any increase in L hip pain

## 2024-11-20 ENCOUNTER — OFFICE VISIT (OUTPATIENT)
Dept: ORTHOPEDIC SURGERY | Facility: HOSPITAL | Age: 24
End: 2024-11-20
Payer: COMMERCIAL

## 2024-11-20 DIAGNOSIS — S73.192D TEAR OF LEFT ACETABULAR LABRUM, SUBSEQUENT ENCOUNTER: ICD-10-CM

## 2024-11-20 DIAGNOSIS — M25.852 FEMOROACETABULAR IMPINGEMENT OF LEFT HIP: Primary | ICD-10-CM

## 2024-11-20 PROCEDURE — 99211 OFF/OP EST MAY X REQ PHY/QHP: CPT | Performed by: SPECIALIST/TECHNOLOGIST

## 2024-11-20 PROCEDURE — 1036F TOBACCO NON-USER: CPT | Performed by: SPECIALIST/TECHNOLOGIST

## 2024-11-21 ENCOUNTER — TREATMENT (OUTPATIENT)
Dept: PHYSICAL THERAPY | Facility: CLINIC | Age: 24
End: 2024-11-21
Payer: COMMERCIAL

## 2024-11-21 DIAGNOSIS — M25.852 FEMOROACETABULAR IMPINGEMENT OF LEFT HIP: ICD-10-CM

## 2024-11-21 PROCEDURE — 97110 THERAPEUTIC EXERCISES: CPT | Mod: GP

## 2024-11-21 PROCEDURE — 97140 MANUAL THERAPY 1/> REGIONS: CPT | Mod: GP

## 2024-11-21 NOTE — PROGRESS NOTES
Physical Therapy  Physical Therapy Treatment Note    Patient Name: Sraa Benton  MRN: [id number]  Today's Date: 11/21/2024  Time Calculation  Start Time: 1400  Stop Time: 1445  Time Calculation (min): 45 min  PT Therapeutic Procedures Time Entry  Manual Therapy Time Entry: 25  Therapeutic Exercise Time Entry: 16        Insurance:  Visit number: 9 of 60  Authorization info: no auth  Insurance Type: Payor: [employer] / Plan: [employer] HEALTH PLAN / Product Type: *No Product type* /   Current Problem  1. Femoroacetabular impingement of left hip  Follow Up In Physical Therapy        General  Reason for Referral: L hip scope  Referred By: Dr. Chávez  General Comment: DOS: 11/5/24    Precautions  Salata Post op Protocol     Subjective:     Pt met with Tim Hare and she was told that she can discontinue to use of her brace. Pt has continued to have minimal pain and she can been keeping up with her HEP.     Pain  2/10 with meds    Objective:   Objective   Treatments:     [id number]     THERE EX    Upright bike x 10 min with brace on (instructed pt to self initiate bike next visit)  Supine BKFO 1 x 20   Supine banded bridge 2 x 10   Supine clamshell 2 x 10 (red)   Standing side steps 1 x 10     MANUAL   PROM Left hip within post op precautions  STM to Left adductors, quad (other areas restricted due to bandage)    NMRE    THERE ACT    Assessment:    Pt tolerated session well today. Pt's ROM continued to show improvements with no pain in end range. Pt was able to progress to isotonic clamshell and begin side stepping in standing. Pt continues to progress towards goals established in the POC and should continue with skilled therapy.             Plan: Progress per protocol     Goals:  Active       PT Problem       PT Goals       Start:  08/27/24       1. Pt will increase LEFS with 65/80 or better in order to demo an increase in L knee function  2. Pt will demo 5/5 or all hip/knee MMT in order to demo an increase in LE strength   3. Pt  will demo compliance and independence with HEP   4.  Pt will be able to jog for 30 mins without any increase in hip pain  5. Pt will be able to perform full fitness routine without any increase in L hip pain

## 2024-11-21 NOTE — PROGRESS NOTES
The patient returns, with her mother, status post 2 weeks left hip arthroscopy on 11/5/2024.  Overall she is doing quite well.  She denies adverse events or issues since time of surgery. Narcotic medication is no longer needed.  They have no evidence of lower extremity DVT.  Negative Homans.  Incisions are clean and dry.  Healing well.  Sutures were removed today.  Steri-Strips applied.  It is okay to get the incisions wet.  Avoid submerging in a hot tub, bathtub, swimming pool or directly under the shower.  Their pain is appropriate.  Range of motion is within normal limits.    Continue therapy per protocol at [facility] physical therapy.  She may discontinue the brace and begin to wean herself off of her crutches.  She is wanting to get back to work following Thanksgiving.  I feel this is a reasonable for her to do so.  She works as a .  She states that her job will accommodate her in the postoperative setting.  She may drive when she feels comfortable doing so and has the strength to slam on the brakes.  I encouraged her to continue icing her hip 15 to 20 minutes at a time 2-3 times per day.  I will plan to see them back 6 weeks from surgery.  Goal is to get back to pain-free and symmetric range of motion compared to her nonoperative right hip.  They are in agreement the plan.  Their questions have been answered.      Onesimo Hare PA-C

## 2024-11-25 ENCOUNTER — TREATMENT (OUTPATIENT)
Dept: PHYSICAL THERAPY | Facility: CLINIC | Age: 24
End: 2024-11-25
Payer: COMMERCIAL

## 2024-11-25 DIAGNOSIS — M25.852 FEMOROACETABULAR IMPINGEMENT OF LEFT HIP: ICD-10-CM

## 2024-11-25 PROCEDURE — 97140 MANUAL THERAPY 1/> REGIONS: CPT | Mod: GP

## 2024-11-25 PROCEDURE — 97110 THERAPEUTIC EXERCISES: CPT | Mod: GP

## 2024-11-25 NOTE — PROGRESS NOTES
Physical Therapy  Physical Therapy Treatment Note    Patient Name: Sara Benton  MRN: [id number]  Today's Date: 11/25/2024  Time Calculation  Start Time: 1530  Stop Time: 1615  Time Calculation (min): 45 min  PT Therapeutic Procedures Time Entry  Manual Therapy Time Entry: 25  Therapeutic Exercise Time Entry: 16        Insurance:  Visit number: 10 of 60  Authorization info: no auth  Insurance Type: Payor: [employer] / Plan: [employer] HEALTH PLAN / Product Type: *No Product type* /   Current Problem  1. Femoroacetabular impingement of left hip  Follow Up In Physical Therapy        General  Reason for Referral: L hip scope  Referred By: Dr. Chávez  General Comment: DOS: 11/5/24    Precautions  Salata Post op Protocol     Subjective:     Pt is 3 weeks post op of L hip scope. Pt continues to feel good overall. She has continued to use one crutch and has been keeping up with her HEP daily.     Pain  2/10 with meds    Objective:   Objective   Treatments:     [id number]     THERE EX    Upright bike x 10 min   Supine banded bridge 2 x 10   Supine clamshell 2 x 10 (green)   Standing hip hike 2 x 10     MANUAL   PROM Left hip within post op precautions  STM to Left adductors, quad (other areas restricted due to bandage)    NMRE    THERE ACT    Assessment:    Pt tolerated session well today. Pt was able to continue to show ROM gains in flexion, IR, and ER with PROM. Pt was able to continue to progress resistance used for clamshell and begin hip hinge in standing. Pt continues to progress towards goals established in the POC and should continue with skilled therapy.               Plan: Progress per protocol     Goals:  Active       PT Problem       PT Goals       Start:  08/27/24       1. Pt will increase LEFS with 65/80 or better in order to demo an increase in L knee function  2. Pt will demo 5/5 or all hip/knee MMT in order to demo an increase in LE strength   3. Pt will demo compliance and independence with HEP   4.  Pt will be  able to jog for 30 mins without any increase in hip pain  5. Pt will be able to perform full fitness routine without any increase in L hip pain

## 2024-11-26 NOTE — PROGRESS NOTES
"Physical Therapy  Physical Therapy Treatment Note    Patient Name: Sara Benton  MRN: [id number]  Today's Date: 11/27/2024  Time Calculation  Start Time: 0245  Stop Time: 0330  Time Calculation (min): 45 min  PT Therapeutic Procedures Time Entry  Manual Therapy Time Entry: 15  Therapeutic Exercise Time Entry: 30        Insurance:  Visit number: 11 of 60  Authorization info: no auth  Insurance Type: Payor: [employer] / Plan: [employer] HEALTH PLAN / Product Type: *No Product type* /   Current Problem  1. Femoroacetabular impingement of left hip  Follow Up In Physical Therapy        General  Reason for Referral: L hip scope  Referred By: Dr. Chávez  General Comment: DOS: 11/5/24    Precautions  Salata Post op Protocol     Subjective:     Patient reports that her hip feels pretty good.  Helped prep for TG so its a little tight.    Pain  2/10  tightness    Objective:   Objective   Treatments:     [id number]     THERE EX    Upright bike x 10 min   Clamshells SL 2 x 10   Standing hip hike 2 x 10   Mini squats 3 x 10   Side stepping 2 x 30\"     MANUAL   PROM Left hip within post op precautions  STM to Left adductors, quad, glut    NMRE    THERE ACT    Assessment:    Pt tolerated session well today. Pt was able to continue to show ROM gains in flexion, IR, and ER with PROM. She was able to increase WB strengthening with no difficulty.  She had improved gait with decreased hip rotation to avoid extension on L at end of session. Pt continues to progress towards goals established in the POC and should continue with skilled therapy.         Plan: Progress per protocol     Goals:  Active       PT Problem       PT Goals       Start:  08/27/24       1. Pt will increase LEFS with 65/80 or better in order to demo an increase in L knee function  2. Pt will demo 5/5 or all hip/knee MMT in order to demo an increase in LE strength   3. Pt will demo compliance and independence with HEP   4.  Pt will be able to jog for 30 mins without any increase " in hip pain  5. Pt will be able to perform full fitness routine without any increase in L hip pain

## 2024-11-27 ENCOUNTER — TREATMENT (OUTPATIENT)
Dept: PHYSICAL THERAPY | Facility: CLINIC | Age: 24
End: 2024-11-27
Payer: COMMERCIAL

## 2024-11-27 DIAGNOSIS — M25.852 FEMOROACETABULAR IMPINGEMENT OF LEFT HIP: Primary | ICD-10-CM

## 2024-11-27 PROCEDURE — 97140 MANUAL THERAPY 1/> REGIONS: CPT | Mod: GP,CQ

## 2024-11-27 PROCEDURE — 97110 THERAPEUTIC EXERCISES: CPT | Mod: GP,CQ

## 2024-12-09 ENCOUNTER — APPOINTMENT (OUTPATIENT)
Dept: PHYSICAL THERAPY | Facility: CLINIC | Age: 24
End: 2024-12-09
Payer: COMMERCIAL

## 2024-12-10 NOTE — PROGRESS NOTES
"Physical Therapy  Physical Therapy Treatment Note    Patient Name: Sara Benton  MRN: [id number]  Today's Date: 12/11/2024  Time Calculation  Start Time: 1130  Stop Time: 1215  Time Calculation (min): 45 min  PT Therapeutic Procedures Time Entry  Manual Therapy Time Entry: 20  Therapeutic Exercise Time Entry: 25        Insurance:  Visit number: 12 of 60  Authorization info: no auth  Insurance Type: Payor: [employer] / Plan: [employer] HEALTH PLAN / Product Type: *No Product type* /   Current Problem  1. Femoroacetabular impingement of left hip  Follow Up In Physical Therapy        General  Reason for Referral: L hip scope  Referred By: Dr. Chávez  General Comment: DOS: 11/5/24    Precautions  Salvincent Post op Protocol     Subjective:     Patient reports that she was so sick last week.  Im feeling better now.  Hip is feeling ok but still having trouble finding good sleeping position.  The colder it is outside the more tight it feels.       Pain  2/10  tightness    Objective:   Objective   Treatments:     [id number] (updated 12/11/24)    THERE EX    Upright bike x 10 min   Clamshells SL 3x 10 GTB  Standing hip hike 2 x 10   Mini squats 3 x 10   Side stepping 2 x 30\" GTB above knees    MANUAL   PROM Left hip within post op precautions  STM to Left adductors, quad, glut  LD with rotation    NMRE    THERE ACT    Assessment:    Pt tolerated session well today.  Good stretch felt with LD.   She was able to increase resistance with strengthening.  Fatigued but no increased pain. Pt continues to progress towards goals established in the POC and should continue with skilled therapy.         Plan: Progress per protocol, 4\" lateral touchdown, shuttle      Goals:  Active       PT Problem       PT Goals       Start:  08/27/24       1. Pt will increase LEFS with 65/80 or better in order to demo an increase in L knee function  2. Pt will demo 5/5 or all hip/knee MMT in order to demo an increase in LE strength   3. Pt will demo compliance and " independence with HEP   4.  Pt will be able to jog for 30 mins without any increase in hip pain  5. Pt will be able to perform full fitness routine without any increase in L hip pain

## 2024-12-11 ENCOUNTER — TREATMENT (OUTPATIENT)
Dept: PHYSICAL THERAPY | Facility: CLINIC | Age: 24
End: 2024-12-11
Payer: COMMERCIAL

## 2024-12-11 DIAGNOSIS — M25.852 FEMOROACETABULAR IMPINGEMENT OF LEFT HIP: Primary | ICD-10-CM

## 2024-12-11 PROCEDURE — 97140 MANUAL THERAPY 1/> REGIONS: CPT | Mod: GP,CQ

## 2024-12-11 PROCEDURE — 97110 THERAPEUTIC EXERCISES: CPT | Mod: GP,CQ

## 2024-12-15 NOTE — PROGRESS NOTES
"Physical Therapy  Physical Therapy Treatment Note    Patient Name: Sara Benton  MRN: [id number]  Today's Date: 12/16/2024  Time Calculation  Start Time: 0745  Stop Time: 0830  Time Calculation (min): 45 min  PT Therapeutic Procedures Time Entry  Manual Therapy Time Entry: 23  Therapeutic Exercise Time Entry: 20        Insurance:  Visit number: 13 of 60  Authorization info: no auth  Insurance Type: Payor: [employer] / Plan: [employer] HEALTH PLAN / Product Type: *No Product type* /   Current Problem  1. Femoroacetabular impingement of left hip          General  Reason for Referral: L hip scope  Referred By: Dr. Chávez  General Comment: DOS: 11/5/24    Precautions  Saira Post op Protocol     Subjective:     Patient reports that her hip doesn't hurt its just tight.     Pain  2/10  tightness    Objective:   Objective   Treatments:     [id number] (updated 12/11/24)    THERE EX    Upright bike x 10 min   Clamshells SL 3x 10 GTB  Shuttle press 50#  3 x 10   4\" lateral touchdown 3 x 10   Mini squats 3 x 10   Side stepping 2 x 45\" GTB above knees    MANUAL   PROM Left hip within post op precautions  STM to Left adductors, quad, glut  LD with rotation    NMRE    THERE ACT    Assessment:    Pt tolerated session well today.  She was able to progress strengthening exercises without pain but was appropriately fatigued.  Pt continues to progress towards goals established in the POC and should continue with skilled therapy.         Plan: Progress per protocol, hip hinge     Goals:  Active       PT Problem       PT Goals       Start:  08/27/24       1. Pt will increase LEFS with 65/80 or better in order to demo an increase in L knee function  2. Pt will demo 5/5 or all hip/knee MMT in order to demo an increase in LE strength   3. Pt will demo compliance and independence with HEP   4.  Pt will be able to jog for 30 mins without any increase in hip pain  5. Pt will be able to perform full fitness routine without any increase in L hip pain    "

## 2024-12-16 ENCOUNTER — TREATMENT (OUTPATIENT)
Dept: PHYSICAL THERAPY | Facility: CLINIC | Age: 24
End: 2024-12-16
Payer: COMMERCIAL

## 2024-12-16 DIAGNOSIS — M25.852 FEMOROACETABULAR IMPINGEMENT OF LEFT HIP: ICD-10-CM

## 2024-12-16 PROCEDURE — 97140 MANUAL THERAPY 1/> REGIONS: CPT | Mod: GP,CQ

## 2024-12-16 PROCEDURE — 97110 THERAPEUTIC EXERCISES: CPT | Mod: GP,CQ

## 2024-12-17 NOTE — PROGRESS NOTES
Physical Therapy  Physical Therapy Treatment Note    Patient Name: Sara Benton  MRN: [id number]  Today's Date: 12/18/2024  Time Calculation  Start Time: 1215  Stop Time: 1300  Time Calculation (min): 45 min  PT Therapeutic Procedures Time Entry  Manual Therapy Time Entry: 20  Therapeutic Exercise Time Entry: 25        Insurance:  Visit number: 14 of 60  Authorization info: no auth  Insurance Type: Payor: [employer] / Plan: [employer] HE[employer] PLAN / Product Type: *No Product type* /   Current Problem  1. Femoroacetabular impingement of left hip  Follow Up In Physical Therapy        General  Reason for Referral: L hip scope  Referred By: Dr. Chávez  General Comment: DOS: 11/5/24    Precautions  Saira Post op Protocol     Subjective:     Patient reports that she worked after last visit and had some      Pain  2/10  tightness    Objective:   Objective   Treatments:     [id number] (updated 12/11/24)    THERE EX    Upright bike x 10 min   Side plank with clamshells 3 x 3sets B   Shuttle press  DL 56#  3 x 10   Shuttle press SL 37#  3 x 10  SL hip hinge with FMR for balance  2 x 10   Mini squats 3 x 10 w/10# KB      MANUAL   PROM Left hip within post op precautions  STM to Left adductors, quad, glut  LD with rotation    NMRE    THERE ACT    Assessment:    Pt tolerated session well today.  She was able to progress strengthening exercises without pain but was appropriately fatigued.  Needed FMR for balance with hip hinges. Good form with exercises. Pt continues to progress towards goals established in the POC and should continue with skilled therapy.         Plan: Progress per protocol,     Goals:  Active       PT Problem       PT Goals       Start:  08/27/24       1. Pt will increase LEFS with 65/80 or better in order to demo an increase in L knee function  2. Pt will demo 5/5 or all hip/knee MMT in order to demo an increase in LE strength   3. Pt will demo compliance and independence with HEP   4.  Pt will be able to jog for 30  mins without any increase in hip pain  5. Pt will be able to perform full fitness routine without any increase in L hip pain

## 2024-12-18 ENCOUNTER — TREATMENT (OUTPATIENT)
Dept: PHYSICAL THERAPY | Facility: CLINIC | Age: 24
End: 2024-12-18
Payer: COMMERCIAL

## 2024-12-18 DIAGNOSIS — M25.852 FEMOROACETABULAR IMPINGEMENT OF LEFT HIP: ICD-10-CM

## 2024-12-18 PROCEDURE — 97110 THERAPEUTIC EXERCISES: CPT | Mod: GP,CQ

## 2024-12-18 PROCEDURE — 97140 MANUAL THERAPY 1/> REGIONS: CPT | Mod: GP,CQ

## 2024-12-20 ENCOUNTER — OFFICE VISIT (OUTPATIENT)
Dept: ORTHOPEDIC SURGERY | Facility: HOSPITAL | Age: 24
End: 2024-12-20
Payer: COMMERCIAL

## 2024-12-20 DIAGNOSIS — M25.852 FEMOROACETABULAR IMPINGEMENT OF LEFT HIP: ICD-10-CM

## 2024-12-20 DIAGNOSIS — S73.192D TEAR OF LEFT ACETABULAR LABRUM, SUBSEQUENT ENCOUNTER: Primary | ICD-10-CM

## 2024-12-20 PROCEDURE — 99211 OFF/OP EST MAY X REQ PHY/QHP: CPT | Performed by: SPECIALIST/TECHNOLOGIST

## 2024-12-20 PROCEDURE — 1036F TOBACCO NON-USER: CPT | Performed by: SPECIALIST/TECHNOLOGIST

## 2024-12-20 NOTE — PROGRESS NOTES
The patient returns, with her father, 6 weeks out from their Left hip arthroscopy on 11/5/2024.  Overall she is doing well.  She says that she is a little sore today.  She has returned to work over the past 2 weeks and has been working multiple shifts in a row this past week.  She denies hip specific pain.  She continues with formal physical therapy and is progressing nicely.    We discussed her clinical presentation 6 weeks status post left hip arthroscopy.  Her range of motion is progressing nicely.  She is slightly tighter on examination today compared to her right hip secondary to the increased amount of walking and time at work.  We agreed upon meloxicam 15 mg taken once daily with food for the next 14 days.  If needed, she can take 2 extra strength Tylenol 3 times a day for pain.  I encouraged her to continue with formal physical therapy.  She may progress towards the strengthening phase of her rehab as her and her physical therapist feel she is ready to do so.  I also encouraged her to ice her hip 15 to 20 minutes at a time 2-3 times per day especially at the end of the day.  She may begin to perform gentle massaging over the incisions.  We will see her back in 6 weeks for clinical recheck.  She is in agreement the plan.  Her questions are answered.      Onesimo Hare PA-C

## 2024-12-23 NOTE — PROGRESS NOTES
Physical Therapy  Physical Therapy Treatment Note    Patient Name: Sara Benton  MRN: [id number]  Today's Date: 12/24/2024  Time Calculation  Start Time: 1215  Stop Time: 1300  Time Calculation (min): 45 min  PT Therapeutic Procedures Time Entry  Manual Therapy Time Entry: 25  Therapeutic Exercise Time Entry: 20        Insurance:  Visit number: 15 of 60  Authorization info: no auth  Insurance Type: Payor: [employer] / Plan: [employer] HEALTH PLAN / Product Type: *No Product type* /   Current Problem  1. Femoroacetabular impingement of left hip  Follow Up In Physical Therapy        General  Reason for Referral: L hip scope  Referred By: Dr. Chávez  General Comment: DOS: 11/5/24    Precautions  Saira Post op Protocol     Subjective:     Patient reports that she is feeling good.  Fatigued from working some many days in a row.  Saw  For [facility] and is doing well.        Pain  2/10  tightness    Objective:   Objective   Treatments:     [id number] (updated 12/11/24)    THERE EX    Upright bike x 10 min      Shuttle press  DL 62#  3 x 10   Shuttle press SL 37#  3 x 10  Reverse lunge 2 x 10   Mini squats 3 x 10 w/10# KB      MANUAL   PROM Left hip within post op precautions  STM to Left adductors, quad, glut  LD with rotation    NMRE    THERE ACT    Assessment:    Pt tolerated session well today.  She was able to progress strengthening exercises without pain but was appropriately fatigued. Tolerating progression of strengthening well. Good form with exercises. Pt continues to progress towards goals established in the POC and should continue with skilled therapy.         Plan: Progress per protocol,     Goals:  Active       PT Problem       PT Goals       Start:  08/27/24       1. Pt will increase LEFS with 65/80 or better in order to demo an increase in L knee function  2. Pt will demo 5/5 or all hip/knee MMT in order to demo an increase in LE strength   3. Pt will demo compliance and independence with HEP   4.  Pt will be able to  jog for 30 mins without any increase in hip pain  5. Pt will be able to perform full fitness routine without any increase in L hip pain

## 2024-12-24 ENCOUNTER — TREATMENT (OUTPATIENT)
Dept: PHYSICAL THERAPY | Facility: CLINIC | Age: 24
End: 2024-12-24
Payer: COMMERCIAL

## 2024-12-24 DIAGNOSIS — M25.852 FEMOROACETABULAR IMPINGEMENT OF LEFT HIP: ICD-10-CM

## 2024-12-24 PROCEDURE — 97110 THERAPEUTIC EXERCISES: CPT | Mod: GP,CQ

## 2024-12-24 PROCEDURE — 97140 MANUAL THERAPY 1/> REGIONS: CPT | Mod: GP,CQ

## 2024-12-26 ENCOUNTER — APPOINTMENT (OUTPATIENT)
Dept: PHYSICAL THERAPY | Facility: CLINIC | Age: 24
End: 2024-12-26
Payer: COMMERCIAL

## 2024-12-26 DIAGNOSIS — M25.852 FEMOROACETABULAR IMPINGEMENT OF LEFT HIP: ICD-10-CM

## 2024-12-30 NOTE — PROGRESS NOTES
Physical Therapy  Physical Therapy Treatment Note    Patient Name: Sara Benton  MRN: [id number]  Today's Date: 12/31/2024  Time Calculation  Start Time: 0100  Stop Time: 0145  Time Calculation (min): 45 min  PT Therapeutic Procedures Time Entry  Manual Therapy Time Entry: 15  Therapeutic Exercise Time Entry: 30        Insurance:  Visit number: 16 of 60  Authorization info: no auth  Insurance Type: Payor: [employer] / Plan: [employer] HEALTH PLAN / Product Type: *No Product type* /   Current Problem  1. Femoroacetabular impingement of left hip  Follow Up In Physical Therapy        General  Reason for Referral: L hip scope  Referred By: Dr. Chávez  General Comment: DOS: 11/5/24    Precautions  Saira Post op Protocol     Subjective:     Patient reports that she is feeling good. Worked a lot but its not as tired or sore after working.      Pain  2/10  tightness    Objective:   Objective   Treatments:     [id number] (updated 12/11/24)    THERE EX    Upright bike x 10 min      Shuttle press  DL 68#  3 x 10   Shuttle press SL 37#  3 x 10  Reverse lunge 2 x 10   Mini squats 3 x 10 w/10# KB  TRX pistol squats  x 10     MANUAL   PROM Left hip within post op precautions  STM to Left adductors, quad, glut  LD with rotation    NMRE    THERE ACT    Assessment:    Pt tolerated session well today.  She was able to progress strengthening exercises without pain but was appropriately fatigued. Tolerating progression of strengthening well. Good form with exercises. Pt continues to progress towards goals established in the POC and should continue with skilled therapy.         Plan: Progress per protocol,     Goals:  Active       PT Problem       PT Goals       Start:  08/27/24       1. Pt will increase LEFS with 65/80 or better in order to demo an increase in L knee function  2. Pt will demo 5/5 or all hip/knee MMT in order to demo an increase in LE strength   3. Pt will demo compliance and independence with HEP   4.  Pt will be able to jog  for 30 mins without any increase in hip pain  5. Pt will be able to perform full fitness routine without any increase in L hip pain

## 2024-12-31 ENCOUNTER — TREATMENT (OUTPATIENT)
Dept: PHYSICAL THERAPY | Facility: CLINIC | Age: 24
End: 2024-12-31
Payer: COMMERCIAL

## 2024-12-31 DIAGNOSIS — M25.852 FEMOROACETABULAR IMPINGEMENT OF LEFT HIP: ICD-10-CM

## 2024-12-31 PROCEDURE — 97110 THERAPEUTIC EXERCISES: CPT | Mod: GP,CQ

## 2024-12-31 PROCEDURE — 97140 MANUAL THERAPY 1/> REGIONS: CPT | Mod: GP,CQ

## 2025-01-02 ENCOUNTER — TREATMENT (OUTPATIENT)
Dept: PHYSICAL THERAPY | Facility: CLINIC | Age: 25
End: 2025-01-02
Payer: COMMERCIAL

## 2025-01-02 DIAGNOSIS — M25.852 FEMOROACETABULAR IMPINGEMENT OF LEFT HIP: ICD-10-CM

## 2025-01-02 PROCEDURE — 97140 MANUAL THERAPY 1/> REGIONS: CPT | Mod: GP,CQ

## 2025-01-02 PROCEDURE — 97110 THERAPEUTIC EXERCISES: CPT | Mod: GP,CQ

## 2025-01-02 NOTE — PROGRESS NOTES
"Physical Therapy  Physical Therapy Treatment Note    Patient Name: Sara Benton  MRN: [id number]  Today's Date: 1/2/2025  Time Calculation  Start Time: 0915  Stop Time: 1000  Time Calculation (min): 45 min  PT Therapeutic Procedures Time Entry  Manual Therapy Time Entry: 20  Therapeutic Exercise Time Entry: 25        Insurance:  Visit number: 1 of 60   (16 used in 2024)  Authorization info: no auth  Insurance Type: Payor: [employer] / Plan: [employer] HEALTH PLAN / Product Type: *No Product type* /   Current Problem  1. Femoroacetabular impingement of left hip  Follow Up In Physical Therapy        General  Reason for Referral: L hip scope  Referred By: Dr. Chávez  General Comment: DOS: 11/5/24    Precautions  Saira Post op Protocol     Subjective:     Patient reports that she is feeling pretty good.      Pain  1/10  tightness    Objective:     Treatments:     [id number] (updated 12/11/24)    THERE EX    Upright bike x 10 min   Side planks with clams 5 x 5  3 way sliders 2 x 10 B  Resisted side stepping 2 x 60\"   Reverse lunge 3 x 10   Mini squats 3 x 10 w/10# KB      MANUAL   PROM Left hip within post op precautions  STM to Left adductors, quad, glut  LD with rotation    NMRE    THERE ACT    Assessment:    Pt tolerated session well today.  She was able to progress strengthening exercises without pain but was appropriately fatigued. Tolerating progression of strengthening well. Good form with exercises. Pt continues to progress towards goals established in the POC and should continue with skilled therapy.         Plan: Progress per protocol,     Goals:  Active       PT Problem       PT Goals       Start:  08/27/24       1. Pt will increase LEFS with 65/80 or better in order to demo an increase in L knee function  2. Pt will demo 5/5 or all hip/knee MMT in order to demo an increase in LE strength   3. Pt will demo compliance and independence with HEP   4.  Pt will be able to jog for 30 mins without any increase in hip " pain  5. Pt will be able to perform full fitness routine without any increase in L hip pain

## 2025-01-08 ENCOUNTER — APPOINTMENT (OUTPATIENT)
Dept: PHYSICAL THERAPY | Facility: CLINIC | Age: 25
End: 2025-01-08
Payer: COMMERCIAL

## 2025-01-08 DIAGNOSIS — M25.852 FEMOROACETABULAR IMPINGEMENT OF LEFT HIP: ICD-10-CM

## 2025-01-13 ENCOUNTER — TREATMENT (OUTPATIENT)
Dept: PHYSICAL THERAPY | Facility: CLINIC | Age: 25
End: 2025-01-13
Payer: COMMERCIAL

## 2025-01-13 DIAGNOSIS — M25.852 FEMOROACETABULAR IMPINGEMENT OF LEFT HIP: ICD-10-CM

## 2025-01-13 PROCEDURE — 97140 MANUAL THERAPY 1/> REGIONS: CPT | Mod: GP

## 2025-01-13 PROCEDURE — 97110 THERAPEUTIC EXERCISES: CPT | Mod: GP

## 2025-01-13 NOTE — PROGRESS NOTES
"Physical Therapy  Physical Therapy Treatment Note    Patient Name: Sara Benton  MRN: [id number]  Today's Date: 1/13/2025  Time Calculation  Start Time: 1400  Stop Time: 1445  Time Calculation (min): 45 min  PT Therapeutic Procedures Time Entry  Manual Therapy Time Entry: 25  Therapeutic Exercise Time Entry: 16        Insurance:  Visit number: 2 of 60   (16 used in 2024)  Authorization info: no auth  Insurance Type: Payor: [employer] / Plan: [employer] HEALTH PLAN / Product Type: *No Product type* /   Current Problem  1. Femoroacetabular impingement of left hip  Follow Up In Physical Therapy        General  Reason for Referral: L hip scope  Referred By: Dr. Chávez  General Comment: DOS: 11/5/24    Precautions  Saira Post op Protocol     Subjective:     Patient is 10 weeks post op. Overall she is doing very well with minimal pain. She still has some moments of discomfort when getting out of the car.     Pain  1/10  tightness    Objective:   Objective   Treatments:     [id number] (updated 12/11/24)    THERE EX    Upright bike x 10 min   Banded side step at ankles (yellow) 3 x 30'   6 and 8\" heel taps 3 x 10   8\" box lands 2 x 6  Mini hops 3 x 20       MANUAL   PROM Left hip within post op precautions  STM to Left adductors, quad, glut  LD with rotation    NMRE    THERE ACT    Assessment:    Pt tolerated session well today.  Pt was able to begin to work on light plyometrics without any increase in hip pian. Pt did well to maintain good knee alignment with all activities. Pt continues to progress towards goals established in the POC and should continue with skilled therapy.           Plan: Progress per protocol,     Goals:  Active       PT Problem       PT Goals       Start:  08/27/24       1. Pt will increase LEFS with 65/80 or better in order to demo an increase in L knee function  2. Pt will demo 5/5 or all hip/knee MMT in order to demo an increase in LE strength   3. Pt will demo compliance and independence with HEP   4.  " Pt will be able to jog for 30 mins without any increase in hip pain  5. Pt will be able to perform full fitness routine without any increase in L hip pain

## 2025-01-20 ENCOUNTER — TREATMENT (OUTPATIENT)
Dept: PHYSICAL THERAPY | Facility: CLINIC | Age: 25
End: 2025-01-20
Payer: COMMERCIAL

## 2025-01-20 DIAGNOSIS — M25.852 FEMOROACETABULAR IMPINGEMENT OF LEFT HIP: ICD-10-CM

## 2025-01-20 PROCEDURE — 97140 MANUAL THERAPY 1/> REGIONS: CPT | Mod: GP

## 2025-01-20 PROCEDURE — 97110 THERAPEUTIC EXERCISES: CPT | Mod: GP

## 2025-01-22 ENCOUNTER — TREATMENT (OUTPATIENT)
Dept: PHYSICAL THERAPY | Facility: CLINIC | Age: 25
End: 2025-01-22
Payer: COMMERCIAL

## 2025-01-22 DIAGNOSIS — M25.852 FEMOROACETABULAR IMPINGEMENT OF LEFT HIP: ICD-10-CM

## 2025-01-22 PROCEDURE — 97110 THERAPEUTIC EXERCISES: CPT | Mod: GP

## 2025-01-22 PROCEDURE — 97140 MANUAL THERAPY 1/> REGIONS: CPT | Mod: GP

## 2025-01-22 NOTE — PROGRESS NOTES
"Physical Therapy  Physical Therapy Treatment Note    Patient Name: Sara Benton  MRN: [id number]  Today's Date: 1/22/2025  Time Calculation  Start Time: 1345  Stop Time: 1430  Time Calculation (min): 45 min  PT Therapeutic Procedures Time Entry  Manual Therapy Time Entry: 23  Therapeutic Exercise Time Entry: 19        Insurance:  Visit number: 4 of 60   (16 used in 2024)  Authorization info: no auth  Insurance Type: Payor: [employer] / Plan: [employer] HEALTH PLAN / Product Type: *No Product type* /   Current Problem  1. Femoroacetabular impingement of left hip  Follow Up In Physical Therapy        General  Reason for Referral: L hip scope  Referred By: Dr. Chávez  General Comment: DOS: 11/5/24    Precautions  Saira Post op Protocol     Subjective:     Patient is almost 12 weeks post op. Pt is eager to begin running next week     Pain  1/10  tightness    Objective:   Objective   Treatments:     [id number] (updated 12/11/24)    THERE EX    Upright bike x 10 min   Agility ladder (one foot, two feet, in/out, lateral) x 3 down/back each   Bosu lateral lands 2 x 10   12\" box lands with vert jump 2 x 6       MANUAL   PROM Left hip within post op precautions  STM to Left adductors, quad, glut  LD with rotation    NMRE    THERE ACT    Assessment:    Pt tolerated session well today.  Pt was able to continue to progress plyometrics without any increase in hip discomfort. Pt was educated that we will begin walk/jog next week. Pt continues to progress towards goals established in the POC and should continue with skilled therapy.             Plan: Progress per protocol,     Goals:  Active       PT Problem       PT Goals       Start:  08/27/24       1. Pt will increase LEFS with 65/80 or better in order to demo an increase in L knee function  2. Pt will demo 5/5 or all hip/knee MMT in order to demo an increase in LE strength   3. Pt will demo compliance and independence with HEP   4.  Pt will be able to jog for 30 mins without any " increase in hip pain  5. Pt will be able to perform full fitness routine without any increase in L hip pain

## 2025-01-27 ENCOUNTER — APPOINTMENT (OUTPATIENT)
Dept: PHYSICAL THERAPY | Facility: CLINIC | Age: 25
End: 2025-01-27
Payer: COMMERCIAL

## 2025-01-27 DIAGNOSIS — M25.852 FEMOROACETABULAR IMPINGEMENT OF LEFT HIP: ICD-10-CM

## 2025-01-29 ENCOUNTER — TREATMENT (OUTPATIENT)
Dept: PHYSICAL THERAPY | Facility: CLINIC | Age: 25
End: 2025-01-29
Payer: COMMERCIAL

## 2025-01-29 DIAGNOSIS — M25.852 FEMOROACETABULAR IMPINGEMENT OF LEFT HIP: ICD-10-CM

## 2025-01-29 PROCEDURE — 97110 THERAPEUTIC EXERCISES: CPT | Mod: GP

## 2025-01-29 PROCEDURE — 97140 MANUAL THERAPY 1/> REGIONS: CPT | Mod: GP

## 2025-01-29 NOTE — PROGRESS NOTES
Physical Therapy  Physical Therapy Treatment Note    Patient Name: Sara Benton  MRN: [id number]  Today's Date: 1/29/2025  Time Calculation  Start Time: 1345  Stop Time: 1430  Time Calculation (min): 45 min  PT Therapeutic Procedures Time Entry  Manual Therapy Time Entry: 23  Therapeutic Exercise Time Entry: 17        Insurance:  Visit number: 5 of 60   (16 used in 2024)  Authorization info: no auth  Insurance Type: Payor: [employer] / Plan: [employer] HEALTH PLAN / Product Type: *No Product type* /   Current Problem  1. Femoroacetabular impingement of left hip  Follow Up In Physical Therapy        General  Reason for Referral: L hip scope  Referred By: Dr. Chávez  General Comment: DOS: 11/5/24    Precautions  Saira Post op Protocol     Subjective:     Patient is 13 weeks post op. Feels ready to begin walk/jog today    Pain  1/10  tightness    Objective:   Objective   Treatments:     [id number] (updated 12/11/24)    THERE EX    Upright bike x 10 min   Banded side steps 3 x 40' (yellow)  Walk jog x 10 mins (60 sec walk/ 30 sec jog)       MANUAL   PROM Left hip within post op precautions  STM to Left adductors, quad, glut  LD with rotation    NMRE    THERE ACT    Assessment:    Pt tolerated session well today.  Pt was able to begin walk/jog program today without any increase in hip pain. Pt did appear to have slightly shorter stride on R side compared to L. Pt continues to progress towards goals established in the POC and should continue with skilled therapy.               Plan: Progress per protocol,     Goals:  Active       PT Problem       PT Goals       Start:  08/27/24       1. Pt will increase LEFS with 65/80 or better in order to demo an increase in L knee function  2. Pt will demo 5/5 or all hip/knee MMT in order to demo an increase in LE strength   3. Pt will demo compliance and independence with HEP   4.  Pt will be able to jog for 30 mins without any increase in hip pain  5. Pt will be able to perform full fitness  routine without any increase in L hip pain

## 2025-02-03 ENCOUNTER — APPOINTMENT (OUTPATIENT)
Dept: ORTHOPEDIC SURGERY | Facility: HOSPITAL | Age: 25
End: 2025-02-03
Payer: COMMERCIAL

## 2025-02-12 ENCOUNTER — TREATMENT (OUTPATIENT)
Dept: PHYSICAL THERAPY | Facility: CLINIC | Age: 25
End: 2025-02-12
Payer: COMMERCIAL

## 2025-02-12 DIAGNOSIS — M25.852 FEMOROACETABULAR IMPINGEMENT OF LEFT HIP: ICD-10-CM

## 2025-02-12 PROCEDURE — 97140 MANUAL THERAPY 1/> REGIONS: CPT | Mod: GP

## 2025-02-12 PROCEDURE — 97110 THERAPEUTIC EXERCISES: CPT | Mod: GP

## 2025-02-12 NOTE — PROGRESS NOTES
Physical Therapy  Physical Therapy Treatment Note    Patient Name: Sara Benton  MRN: [id number]  Today's Date: 2/12/2025  Time Calculation  Start Time: 1120  Stop Time: 1200  Time Calculation (min): 40 min  PT Therapeutic Procedures Time Entry  Manual Therapy Time Entry: 15  Therapeutic Exercise Time Entry: 25        Insurance:  Visit number: 6 of 60   (16 used in 2024)  Authorization info: no auth  Insurance Type: Payor: [employer] / Plan: [employer] HEALTH PLAN / Product Type: *No Product type* /   Current Problem  1. Femoroacetabular impingement of left hip  Follow Up In Physical Therapy        General  Reason for Referral: L hip scope  Referred By: Dr. Chávez  General Comment: DOS: 11/5/24    Precautions  Saira Post op Protocol     Subjective:   Patient is 14.1 weeks post op. Missed recent appointments secondary to work. She has been doing her walk/jog program and it's going very well. Feels good to exercise more. Follows up with ortho on 2/14/25.     Pain  No pain or tightness    Objective:   Objective     Treatments:     [id number] (updated 12/11/24)    THERE EX  25  Upright bike x 5 min   Walk (2.5 mphs) x 2 min/Jog (5.0 mphs) x 1 min = 9 min total  Squat jumps 3 x 10  Single leg hop in place x 30 R/L   Plank on toes and knees - reviewed; able to perform better; add to HEP      MANUAL 15  PROM Left hip within post op precautions  STM to Left adductors, quad, glut  LD with rotation    NMRE    THERE ACT    Assessment:    Pt doing well and on track with post op protocol despite missing recent sessions. Progressing nicely through walk/jog protocol. Encouraged pt to work on single leg hops and the plank for additional conditioning. Follows up with ortho later this week. Pt continues to progress towards goals established in the POC and should continue with skilled therapy.       Plan: Progress per protocol,     Goals:  Active       PT Problem       PT Goals       Start:  08/27/24       1. Pt will increase LEFS with 65/80  or better in order to demo an increase in L knee function  2. Pt will demo 5/5 or all hip/knee MMT in order to demo an increase in LE strength   3. Pt will demo compliance and independence with HEP   4.  Pt will be able to jog for 30 mins without any increase in hip pain  5. Pt will be able to perform full fitness routine without any increase in L hip pain

## 2025-02-14 ENCOUNTER — OFFICE VISIT (OUTPATIENT)
Dept: ORTHOPEDIC SURGERY | Facility: HOSPITAL | Age: 25
End: 2025-02-14
Payer: COMMERCIAL

## 2025-02-14 DIAGNOSIS — S73.192D TEAR OF LEFT ACETABULAR LABRUM, SUBSEQUENT ENCOUNTER: Primary | ICD-10-CM

## 2025-02-14 PROCEDURE — 99213 OFFICE O/P EST LOW 20 MIN: CPT | Performed by: SPECIALIST/TECHNOLOGIST

## 2025-02-14 PROCEDURE — 1036F TOBACCO NON-USER: CPT | Performed by: SPECIALIST/TECHNOLOGIST

## 2025-02-14 NOTE — PROGRESS NOTES
The patient returns, with her parents, 14 weeks out from their Left hip arthroscopy on 11/5/2024.  Overall she is doing better.  She reports a little bit of muscle soreness but denies pain.  She is improved since her last visit.  The anti-inflammatory medication helped and getting used to being back at work also helped.  She denies injury or trauma since her last visit.  Continues with formal physical therapy and has started her plyometric and walk jog program.    The patient, her parents and I discussed her clinical presentation 14 weeks status post left hip arthroscopy.  Her range of motion is nice.  It is symmetric to the nonoperative right hip.  Her strength continues to improve.  She has some slight tenderness over hip flexor on her left side however this is not giving her much difficulties.  I encouraged her to continue with formal physical therapy and progress her plyometric and walk jog program as she can tolerate.  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  I encouraged continued home exercise program and continued strength and endurance.  Okay to use the stationary bicycle, elliptical and swimming pool.  Is okay for her to do water submersion's.  She asked if interventions need to be performed on her right side and we had a lengthy discussion regarding this.  We will investigate and workup the right hip as it is needed.  She will follow-up in 3 months for clinical recheck.  She is in agreement the plan.  Her questions have been answered.      Onesimo Hare PA-C

## 2025-02-17 ENCOUNTER — APPOINTMENT (OUTPATIENT)
Dept: PHYSICAL THERAPY | Facility: CLINIC | Age: 25
End: 2025-02-17
Payer: COMMERCIAL

## 2025-02-17 DIAGNOSIS — M25.852 FEMOROACETABULAR IMPINGEMENT OF LEFT HIP: ICD-10-CM

## 2025-02-19 ENCOUNTER — APPOINTMENT (OUTPATIENT)
Dept: PHYSICAL THERAPY | Facility: CLINIC | Age: 25
End: 2025-02-19
Payer: COMMERCIAL

## 2025-02-19 DIAGNOSIS — M25.852 FEMOROACETABULAR IMPINGEMENT OF LEFT HIP: ICD-10-CM

## 2025-02-24 ENCOUNTER — APPOINTMENT (OUTPATIENT)
Dept: PHYSICAL THERAPY | Facility: CLINIC | Age: 25
End: 2025-02-24
Payer: COMMERCIAL

## 2025-02-24 DIAGNOSIS — M25.852 FEMOROACETABULAR IMPINGEMENT OF LEFT HIP: ICD-10-CM

## 2025-06-20 ENCOUNTER — HOSPITAL ENCOUNTER (OUTPATIENT)
Dept: RADIOLOGY | Facility: CLINIC | Age: 25
Discharge: HOME | End: 2025-06-20
Payer: COMMERCIAL

## 2025-06-20 DIAGNOSIS — Z30.431 IUD CHECK UP: ICD-10-CM

## 2025-06-20 PROCEDURE — 76856 US EXAM PELVIC COMPLETE: CPT

## 2025-07-07 ENCOUNTER — APPOINTMENT (OUTPATIENT)
Dept: ORTHOPEDIC SURGERY | Facility: HOSPITAL | Age: 25
End: 2025-07-07
Payer: COMMERCIAL

## 2025-07-16 ENCOUNTER — APPOINTMENT (OUTPATIENT)
Dept: ORTHOPEDIC SURGERY | Facility: HOSPITAL | Age: 25
End: 2025-07-16
Payer: COMMERCIAL

## 2025-07-16 DIAGNOSIS — S73.192D TEAR OF LEFT ACETABULAR LABRUM, SUBSEQUENT ENCOUNTER: Primary | ICD-10-CM

## 2025-07-16 PROCEDURE — 1036F TOBACCO NON-USER: CPT | Performed by: SPECIALIST/TECHNOLOGIST

## 2025-07-16 PROCEDURE — 99212 OFFICE O/P EST SF 10 MIN: CPT | Performed by: SPECIALIST/TECHNOLOGIST

## 2025-07-16 PROCEDURE — 99213 OFFICE O/P EST LOW 20 MIN: CPT | Performed by: SPECIALIST/TECHNOLOGIST

## 2025-07-16 RX ORDER — NAPROXEN 500 MG/1
500 TABLET ORAL 2 TIMES DAILY
Qty: 28 TABLET | Refills: 0 | Status: SHIPPED | OUTPATIENT
Start: 2025-07-16 | End: 2025-07-30

## 2025-07-16 NOTE — PROGRESS NOTES
The patient returns 7 months out from their Left hip arthroscopy on 11/5/2024.  Overall she is doing well.  Denies adverse events or issues since last visit.  She does report some soreness with prolonged standing and walking over the groin and anterior hip.  Formal physical therapy has been completed and she has been doing some home exercise program.  She has been working out without issues.      The patient and I discussed her clinical presentation 7-month status post left hip arthroscopy.  Overall doing well.  Bilateral lower extremity compartments are soft and supple.  Range of motion is symmetric bilaterally, with some tightness at the end range of internal rotation on her left hip versus her right.  Strength is symmetric bilaterally.  She has some tenderness to palpation over the adductor tendon and anterior hip.  We discussed using naproxen 500 mg twice daily with food for the next 14 days in conjunction to two 500 mg extra strength Tylenol 3 times daily as needed.  We provided her with a referral back to formal physical therapy to focus on in office modalities, deep tissue massage, capsular mobility.  She may do activities to her tolerance.  She will follow-up on an as-needed basis.  She is in agreement the plan.  Questions are answered.        Onesimo Hare PA-C

## (undated) DEVICE — GOWN, SURGICAL, SMARTGOWN, XLARGE, STERILE

## (undated) DEVICE — ARTHROWAND, MULTIVAC 50XL, ICW

## (undated) DEVICE — BLADE, GREAT WHITE CONCAVE, 4.2MM, PREBENT

## (undated) DEVICE — CANNULA, 7 X 110

## (undated) DEVICE — SLINGSHOT, 45 DEG UP

## (undated) DEVICE — SUTURE TAPE, XBRAID, 1.4MM BLACK/WHITE

## (undated) DEVICE — SUTURE, ETHILON, 3-0, 18 IN, PS1, BLACK

## (undated) DEVICE — SUTURE MANAGER, NANOPASSER, CRESCENT

## (undated) DEVICE — DRILL, ICONIX, 1.4MM, DISPOSABLE

## (undated) DEVICE — CANNULA, FLOWPORT II, WITH OBTURATOR

## (undated) DEVICE — DRAPE, INSTRUMENT, W/POUCH, STERI DRAPE, 9 5/8 X 18 LONG

## (undated) DEVICE — KIT, HIP POSTFREE, MEDIUM, GUARDIAN

## (undated) DEVICE — GLOVE, SURGICAL, PROTEXIS PI BLUE W/NEUTHERA, 7.0, PF, LF

## (undated) DEVICE — GLOVE, SURGICAL, PROTEXIS PI MICRO, 6.5, PF, LF

## (undated) DEVICE — TUBING, OUTFLOW, DRAIN PIPE, W/ONE WAY VALVE (FMS VUE)

## (undated) DEVICE — SOLUTION, IRRI GATION, LACTATED RINGERS, 3000 ML, BAG

## (undated) DEVICE — CONTAINER, SPECIMEN, 4 OZ, OR PEEL PACK, STERILE

## (undated) DEVICE — BLADE, ARTHRO-LOK, BANANA, SHARP ALL AROUND, 4 MM

## (undated) DEVICE — COVER, C-ARM W/CLIPS, OEC GE

## (undated) DEVICE — Device

## (undated) DEVICE — SYRINGE, 60 CC, IRRIGATION, BULB, CONTRO-BULB, PAPER POUCH

## (undated) DEVICE — GLOVE, SURGICAL, PROTEXIS PI MICRO, 8.0, PF, LF

## (undated) DEVICE — GLOVE, SURGICAL, PROTEXIS PI W/NEU-THERA, 8.5, PF, LF

## (undated) DEVICE — CATHETER TRAY, FOLEY, 18FR, 10ML, W/ DRAINBAG

## (undated) DEVICE — BUR, SPHERICAL, 5.5MM, PREBENT

## (undated) DEVICE — BUR, SPHERICAL, 4.5MM, PREBENT

## (undated) DEVICE — ENTRY KIT, PORTAL

## (undated) DEVICE — SYRINGE, 10 CC, LUER LOCK

## (undated) DEVICE — FORCE FIBER P2,  BLUE CO-BRSAID, 38IN STRAND

## (undated) DEVICE — TUBING, SUCTION, 9 FT, STERILE, CLEAR